# Patient Record
Sex: MALE | Race: WHITE | NOT HISPANIC OR LATINO | Employment: OTHER | URBAN - METROPOLITAN AREA
[De-identification: names, ages, dates, MRNs, and addresses within clinical notes are randomized per-mention and may not be internally consistent; named-entity substitution may affect disease eponyms.]

---

## 2017-01-10 ENCOUNTER — HOSPITAL ENCOUNTER (OUTPATIENT)
Dept: RADIOLOGY | Facility: HOSPITAL | Age: 62
Discharge: HOME | End: 2017-01-10
Attending: FAMILY MEDICINE
Payer: COMMERCIAL

## 2017-01-10 ENCOUNTER — OFFICE VISIT (OUTPATIENT)
Dept: FAMILY MEDICINE | Facility: CLINIC | Age: 62
End: 2017-01-10
Payer: COMMERCIAL

## 2017-01-10 VITALS
TEMPERATURE: 98 F | WEIGHT: 188.25 LBS | BODY MASS INDEX: 29.55 KG/M2 | DIASTOLIC BLOOD PRESSURE: 70 MMHG | OXYGEN SATURATION: 97 % | HEIGHT: 67 IN | HEART RATE: 105 BPM | SYSTOLIC BLOOD PRESSURE: 136 MMHG

## 2017-01-10 DIAGNOSIS — F41.9 ANXIETY: Primary | ICD-10-CM

## 2017-01-10 DIAGNOSIS — F41.1 GAD (GENERALIZED ANXIETY DISORDER): ICD-10-CM

## 2017-01-10 DIAGNOSIS — K22.70 BARRETT'S ESOPHAGUS WITHOUT DYSPLASIA: ICD-10-CM

## 2017-01-10 DIAGNOSIS — R07.9 CHEST PAIN: ICD-10-CM

## 2017-01-10 DIAGNOSIS — Z00.00 PREVENTATIVE HEALTH CARE: ICD-10-CM

## 2017-01-10 PROCEDURE — 99999 PR PBB SHADOW E&M-EST. PATIENT-LVL III: CPT | Mod: PBBFAC,,, | Performed by: FAMILY MEDICINE

## 2017-01-10 PROCEDURE — 99213 OFFICE O/P EST LOW 20 MIN: CPT | Mod: 25,S$GLB,, | Performed by: FAMILY MEDICINE

## 2017-01-10 PROCEDURE — 71020 XR CHEST PA AND LATERAL: CPT | Mod: TC,PO

## 2017-01-10 PROCEDURE — 1159F MED LIST DOCD IN RCRD: CPT | Mod: S$GLB,,, | Performed by: FAMILY MEDICINE

## 2017-01-10 PROCEDURE — 90686 IIV4 VACC NO PRSV 0.5 ML IM: CPT | Mod: S$GLB,,, | Performed by: FAMILY MEDICINE

## 2017-01-10 PROCEDURE — 71020 XR CHEST PA AND LATERAL: CPT | Mod: 26,,, | Performed by: RADIOLOGY

## 2017-01-10 PROCEDURE — 90471 IMMUNIZATION ADMIN: CPT | Mod: S$GLB,,, | Performed by: FAMILY MEDICINE

## 2017-01-10 RX ORDER — ALPRAZOLAM 1 MG/1
1 TABLET ORAL 2 TIMES DAILY PRN
Qty: 60 TABLET | Refills: 5 | Status: SHIPPED | OUTPATIENT
Start: 2017-01-10 | End: 2017-07-10 | Stop reason: SDUPTHER

## 2017-01-10 NOTE — MR AVS SNAPSHOT
[facility]  [address]  [place] [address]  Phone: [phone]  Fax: [phone]                  Jose Blanco   1/10/2017 1:40 PM   Office Visit    Description:  Male : 1955   Provider:  Red Camejo MD   Department:  [facility]           Reason for Visit     Anxiety           Diagnoses this Visit        Comments    Anxiety    -  Primary     Preventative health care         Diaz's esophagus without dysplasia         SHAYAN (generalized anxiety disorder)                To Do List           Future Appointments        Provider Department Dept Phone    7/10/2017 8:10 AM LAB, [place] [facility] [phone]    2017 2:00 PM Red Camejo MD [facility] [phone]      Goals (5 Years of Data)     None      Follow-Up and Disposition     Return in about 6 months (around 7/10/2017).       These Medications        Disp Refills Start End    alprazolam (XANAX) 1 MG tablet 60 tablet 5 1/10/2017     Take 1 tablet (1 mg total) by mouth 2 (two) times daily as needed for Anxiety. - Oral    Pharmacy: [id number] - [place] - [address]  #: [phone]         [facility] On Call     [facility] -  Assistance  Registered nurses in the [facility] provide clinical advisement, health education, appointment booking, and other advisory services.  Call for this free service at [phone].             Medications           Message regarding Medications     Verify the changes and/or additions to your medication regime listed below are the same as discussed with your clinician today.  If any of these changes or additions are incorrect, please notify your healthcare provider.             Verify that the below list of medications is an accurate representation of the medications you are currently taking.  If none reported, the list may be blank. If incorrect, please  "contact your healthcare provider. Carry this list with you in case of emergency.           Current Medications     alprazolam (XANAX) 1 MG tablet Take 1 tablet (1 mg total) by mouth 2 (two) times daily as needed for Anxiety.    esomeprazole (NEXIUM) 40 MG capsule Take 1 capsule (40 mg total) by mouth once daily.    sildenafil (REVATIO) 20 mg Tab Take 1 tablet (20 mg total) by mouth 3 (three) times daily.    venlafaxine (EFFEXOR-XR) 75 MG 24 hr capsule TAKE 1 CAPSULE (75 MG TOTAL) BY MOUTH ONCE DAILY.    meloxicam (MOBIC) 15 MG tablet Take 1 tablet (15 mg total) by mouth once daily.           Clinical Reference Information           Vital Signs - Last Recorded  Most recent update: 1/10/2017  1:45 PM by Kristine Recio MA    BP Pulse Temp Ht Wt SpO2    136/70 105 98.4 °F (36.9 °C) (Oral) 5' 7" (1.702 m) 85.4 kg (188 lb 4.4 oz) 97%    BMI                29.49 kg/m2          Blood Pressure          Most Recent Value    BP  136/70      Allergies as of 1/10/2017     No Known Drug Allergies      Immunizations Administered on Date of Encounter - 1/10/2017     Name Date Dose VIS Date Route    influenza - Quadrivalent - PF (ADULT) 1/10/2017 0.5 mL 8/7/2015 Intramuscular      Orders Placed During Today's Visit      Normal Orders This Visit    Influenza - Quadrivalent (3 years & older) (PF)     Future Labs/Procedures Expected by Expires    Comprehensive metabolic panel  7/9/2017 3/11/2018    Lipid panel  7/9/2017 1/10/2018    PSA, Screening  7/9/2017 3/11/2018      Smoking Cessation     If you would like to quit smoking:   You may be eligible for free services if you are a [place] resident and started smoking cigarettes before September 1, 1988.  Call the [employer]oking [employer]ess[employer] ([employer]) toll free at [phone] or [phone].   Call 0-093-QUIT-NOW if you do not meet the above criteria.            "
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
## 2017-04-05 RX ORDER — ESOMEPRAZOLE MAGNESIUM 40 MG/1
CAPSULE, DELAYED RELEASE ORAL
Qty: 30 CAPSULE | Refills: 11 | Status: SHIPPED | OUTPATIENT
Start: 2017-04-05 | End: 2018-03-08 | Stop reason: SDUPTHER

## 2017-06-05 DIAGNOSIS — F41.1 GAD (GENERALIZED ANXIETY DISORDER): ICD-10-CM

## 2017-06-05 RX ORDER — VENLAFAXINE HYDROCHLORIDE 75 MG/1
CAPSULE, EXTENDED RELEASE ORAL
Qty: 30 CAPSULE | Refills: 10 | Status: SHIPPED | OUTPATIENT
Start: 2017-06-05 | End: 2018-05-07 | Stop reason: SDUPTHER

## 2017-07-10 DIAGNOSIS — F41.1 GAD (GENERALIZED ANXIETY DISORDER): ICD-10-CM

## 2017-07-10 RX ORDER — ALPRAZOLAM 1 MG/1
1 TABLET ORAL 2 TIMES DAILY PRN
Qty: 60 TABLET | Refills: 5 | Status: SHIPPED | OUTPATIENT
Start: 2017-07-10 | End: 2018-03-08 | Stop reason: SDUPTHER

## 2018-03-08 ENCOUNTER — OFFICE VISIT (OUTPATIENT)
Dept: FAMILY MEDICINE | Facility: CLINIC | Age: 63
End: 2018-03-08
Payer: COMMERCIAL

## 2018-03-08 VITALS
OXYGEN SATURATION: 95 % | RESPIRATION RATE: 18 BRPM | TEMPERATURE: 99 F | HEART RATE: 85 BPM | SYSTOLIC BLOOD PRESSURE: 112 MMHG | WEIGHT: 187.81 LBS | HEIGHT: 67 IN | BODY MASS INDEX: 29.48 KG/M2 | DIASTOLIC BLOOD PRESSURE: 70 MMHG

## 2018-03-08 DIAGNOSIS — Z00.00 PREVENTATIVE HEALTH CARE: Primary | ICD-10-CM

## 2018-03-08 DIAGNOSIS — F41.1 GAD (GENERALIZED ANXIETY DISORDER): ICD-10-CM

## 2018-03-08 DIAGNOSIS — F41.9 ANXIETY: ICD-10-CM

## 2018-03-08 PROCEDURE — 99999 PR PBB SHADOW E&M-EST. PATIENT-LVL III: CPT | Mod: PBBFAC,,, | Performed by: FAMILY MEDICINE

## 2018-03-08 PROCEDURE — 99396 PREV VISIT EST AGE 40-64: CPT | Mod: S$GLB,,, | Performed by: FAMILY MEDICINE

## 2018-03-08 RX ORDER — ALPRAZOLAM 1 MG/1
1 TABLET ORAL 2 TIMES DAILY PRN
Qty: 60 TABLET | Refills: 5 | Status: SHIPPED | OUTPATIENT
Start: 2018-03-08 | End: 2018-06-15 | Stop reason: CLARIF

## 2018-03-08 RX ORDER — ESOMEPRAZOLE MAGNESIUM 40 MG/1
40 CAPSULE, DELAYED RELEASE ORAL DAILY
Qty: 30 CAPSULE | Refills: 11 | Status: ON HOLD | OUTPATIENT
Start: 2018-03-08 | End: 2018-06-18 | Stop reason: HOSPADM
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
## 2018-05-07 DIAGNOSIS — F41.1 GAD (GENERALIZED ANXIETY DISORDER): ICD-10-CM

## 2018-05-07 RX ORDER — VENLAFAXINE HYDROCHLORIDE 75 MG/1
CAPSULE, EXTENDED RELEASE ORAL
Qty: 30 CAPSULE | Refills: 10 | Status: SHIPPED | OUTPATIENT
Start: 2018-05-07 | End: 2019-03-31 | Stop reason: SDUPTHER

## 2018-06-15 PROBLEM — I21.4 NSTEMI (NON-ST ELEVATED MYOCARDIAL INFARCTION): Status: ACTIVE | Noted: 2018-06-15

## 2018-06-15 PROBLEM — I25.110 CORONARY ARTERY DISEASE INVOLVING NATIVE CORONARY ARTERY OF NATIVE HEART WITH UNSTABLE ANGINA PECTORIS: Status: ACTIVE | Noted: 2018-06-15

## 2018-06-15 PROBLEM — I20.9 ANGINA, CLASS IV: Status: ACTIVE | Noted: 2018-06-15

## 2018-06-16 PROBLEM — D69.6 THROMBOCYTOPENIA: Status: ACTIVE | Noted: 2018-06-16

## 2018-06-20 ENCOUNTER — TELEPHONE (OUTPATIENT)
Dept: ADMINISTRATIVE | Facility: HOSPITAL | Age: 63
End: 2018-06-20

## 2018-06-20 NOTE — TELEPHONE ENCOUNTER
[facility] nurse notified via email that patient was admitted to [facility] on 06/15/2018 for Angina pectoris, unspecified

## 2018-10-15 DIAGNOSIS — F41.1 GAD (GENERALIZED ANXIETY DISORDER): ICD-10-CM

## 2018-10-15 RX ORDER — ALPRAZOLAM 1 MG/1
1 TABLET ORAL 2 TIMES DAILY PRN
Qty: 60 TABLET | Refills: 0 | Status: SHIPPED | OUTPATIENT
Start: 2018-10-15 | End: 2018-11-14 | Stop reason: SDUPTHER

## 2018-11-06 ENCOUNTER — OFFICE VISIT (OUTPATIENT)
Dept: FAMILY MEDICINE | Facility: CLINIC | Age: 63
End: 2018-11-06
Payer: COMMERCIAL

## 2018-11-06 VITALS
DIASTOLIC BLOOD PRESSURE: 86 MMHG | OXYGEN SATURATION: 96 % | HEIGHT: 67 IN | HEART RATE: 89 BPM | WEIGHT: 184.5 LBS | TEMPERATURE: 98 F | SYSTOLIC BLOOD PRESSURE: 114 MMHG | RESPIRATION RATE: 18 BRPM | BODY MASS INDEX: 28.96 KG/M2

## 2018-11-06 DIAGNOSIS — K22.70 BARRETT'S ESOPHAGUS WITHOUT DYSPLASIA: ICD-10-CM

## 2018-11-06 DIAGNOSIS — F41.9 ANXIETY: ICD-10-CM

## 2018-11-06 DIAGNOSIS — I25.110 CORONARY ARTERY DISEASE INVOLVING NATIVE CORONARY ARTERY OF NATIVE HEART WITH UNSTABLE ANGINA PECTORIS: ICD-10-CM

## 2018-11-06 DIAGNOSIS — I21.4 NSTEMI (NON-ST ELEVATED MYOCARDIAL INFARCTION): Primary | ICD-10-CM

## 2018-11-06 PROCEDURE — 90471 IMMUNIZATION ADMIN: CPT | Mod: S$GLB,,, | Performed by: FAMILY MEDICINE

## 2018-11-06 PROCEDURE — 90686 IIV4 VACC NO PRSV 0.5 ML IM: CPT | Mod: S$GLB,,, | Performed by: FAMILY MEDICINE

## 2018-11-06 PROCEDURE — 99999 PR PBB SHADOW E&M-EST. PATIENT-LVL IV: CPT | Mod: PBBFAC,,, | Performed by: FAMILY MEDICINE

## 2018-11-06 PROCEDURE — 3008F BODY MASS INDEX DOCD: CPT | Mod: CPTII,S$GLB,, | Performed by: FAMILY MEDICINE

## 2018-11-06 PROCEDURE — 99214 OFFICE O/P EST MOD 30 MIN: CPT | Mod: 25,S$GLB,, | Performed by: FAMILY MEDICINE

## 2018-11-06 RX ORDER — NITROGLYCERIN 0.4 MG/1
0.4 TABLET SUBLINGUAL EVERY 5 MIN PRN
Qty: 100 TABLET | Refills: 0 | Status: ON HOLD | OUTPATIENT
Start: 2018-11-06 | End: 2024-01-28 | Stop reason: HOSPADM

## 2018-11-06 RX ORDER — ESOMEPRAZOLE MAGNESIUM 40 MG/1
CAPSULE, DELAYED RELEASE ORAL
COMMUNITY
Start: 2018-10-31 | End: 2019-04-01 | Stop reason: SDUPTHER

## 2018-11-06 RX ORDER — METOPROLOL SUCCINATE 25 MG/1
25 TABLET, EXTENDED RELEASE ORAL DAILY
Refills: 10 | COMMUNITY
Start: 2018-10-08 | End: 2024-01-12 | Stop reason: SDUPTHER

## 2018-11-06 NOTE — PROGRESS NOTES
Chief Complaint   Patient presents with    Follow-up     heart attack mejia 15 of this year, 2 stents     This is a 63-year-old white male who presents for 6 month f/u on admission in June for MI.    Admission Date: 6/15/2018  Hospital Length of Stay: 3 days  Discharge Date and Time:  06/18/2018 9:10 AM  Attending Physician: No att. providers found  Discharging Provider: Scott Lancaster MD  Primary Care Physician: Red Camejo MD     HPI:   63 year old male who presented to ER on 6/15/18 with c/o chest pain that began at approximately 2am that morning. He reports associated radiation to left arm with diaphoresis and nausea. Initial troponin in ER was 26.8.      PMH: HLD, BPH, Diaz esophagus, anxiety, hearing loss (hearing aids), smoker     Hospital Course:   6/15- LHC with PCI/JEANETTE LAD and RCA via left radial artery and mild global hypokinesis on LV gram  -troponin 26.8-->30.3-->17.8  6/16/18- severe and acute thrombocytopenia with platelets 17k - hematology and PCP consulted  -thrombocytopenia most likely from tirofiban since patient is newly exposed to heparin and antibodies would have a slower drop in platelets. Continue ASA and brilinta s/p PCI/JEANETTE.  -change protonix to famotidine due to thrombocytopenia- resume protonix once thrombocytopenia resolved.    Walking some 4 days a week.    CAD s/p stents, previous MI - tolerating Lipitor 80mg, Coreg 3.125mg BID, lisinopril 2.5mg, Brilinta, ASA 81mg; following with cardiology, Dr. zarco  Anxiety -  Tolerating Effexor XR 75mg daily.  Mood is controlled. He is taking Xanax 1mg 1.5 - 2 tabs daily.  Actively .  Barretts - doing well on the Nexium 40 mg daily  Wearing hearing aids now.       Past Medical History:   Diagnosis Date    Anxiety     Diaz esophagus     BPH (benign prostatic hyperplasia)     CAD (coronary artery disease)     Essential tremor     HEARING LOSS     hearing aids    History of MI (myocardial infarction)     HLD  (hyperlipidemia)     Shingles     right upper back     Past Surgical History:   Procedure Laterality Date    CORONARY STENT PLACEMENT      RCA, LAD    Left heart cath Left 6/15/2018    Performed by Gino Olson MD at [facility] CATH    LEFT HEART CATHETERIZATION Left 6/15/2018    Procedure: Left heart cath;  Surgeon: Gino Olson MD;  Location: [facility] CATH;  Service: Cardiology;  Laterality: Left;    NASAL SEPTUM SURGERY      TONSILLECTOMY, ADENOIDECTOMY, BILATERAL MYRINGOTOMY AND TUBES       Current Outpatient Medications on File Prior to Visit   Medication Sig Dispense Refill    ALPRAZolam (XANAX) 1 MG tablet TAKE 1 TABLET (1 MG TOTAL) BY MOUTH 2 (TWO) TIMES DAILY AS NEEDED FOR ANXIETY. 60 tablet 0    aspirin (ECOTRIN) 81 MG EC tablet Take 1 tablet (81 mg total) by mouth once daily.  0    atorvastatin (LIPITOR) 80 MG tablet Take 1 tablet (80 mg total) by mouth every evening. 90 tablet 3    carvedilol (COREG) 3.125 MG tablet Take 1 tablet (3.125 mg total) by mouth 2 (two) times daily. 60 tablet 11    esomeprazole (NEXIUM) 40 MG capsule       famotidine (PEPCID) 20 MG tablet Take 1 tablet (20 mg total) by mouth 2 (two) times daily. 60 tablet 11    lisinopril (PRINIVIL,ZESTRIL) 2.5 MG tablet Take 1 tablet (2.5 mg total) by mouth once daily. 90 tablet 3    metoprolol succinate (TOPROL-XL) 25 MG 24 hr tablet Take 25 mg by mouth once daily.  10    ticagrelor (BRILINTA) 90 mg tablet Take 1 tablet (90 mg total) by mouth 2 (two) times daily. 60 tablet 11    venlafaxine (EFFEXOR-XR) 75 MG 24 hr capsule TAKE 1 CAPSULE (75 MG TOTAL) BY MOUTH ONCE DAILY. 30 capsule 10     No current facility-administered medications on file prior to visit.          FAMILY HISTORY:  Father: CABG at 67, prostate cancer  Mother:alzheimer's  3 sisters  1 brother    SOCIAL HISTORY:  Tobacco use: variable (1/2 PPD)  Alcohol use:weekly wine  Ilicit drug use:denies  Occupation:residential development,   Marital  "status:  Children:5    HEALTH MAINTANENCE:  stress test 2009 normal    REVIEW OF SYSTEMS:  GENERAL: No fever, chills, fatigability or weight changes.  SKIN/BREASTS: No rashes, sores, itching or changes in color or texture of skin.  HEAD: No headaches or recent head trauma.   EYES: Visual acuity fine. Denies blurriness, tearing, itching, photophobia, diplopia, or visual changes.   EARS: Denies ear pain, discharge, tinnitus or vertigo.  NOSE: No loss of smell, epistaxis, postnasal drip, discharge, obstruction, or sneezing.  MOUTH & THROAT: No hoarseness, change in voice, swallowing difficulty. No excessive gum bleeding.   HEMATOLOGICAL/NODES: Denies swollen glands. No bleeding or bruising.  CHEST: No KOVACS, cyanosis, wheezing, cough or sputum production.  CARDIOVASCULAR: Denies dyspnea, orthopnea, or palpitations.  GI/ABDOMEN: Appetite fine. No weight loss. Denies nausea, vomiting, diarrhea, constipation, abdominal pain, hematemesis or blood in stool.  URINARY: No dysuria,hematuria, nocturia, incontinence, flank pain, urgency, or urinary difficulty.  PERIPHERAL VASCULAR: No claudication, cold intolerance or cyanosis.  MUSCULOSKELETAL: No joint stiffness, pain, swelling, or loss of strength. Denies back pain.  NEUROLOGIC: No history of seizures, paralysis, alteration of gait or coordination.    PHYSICAL EXAM:  /86 (BP Location: Left arm, Patient Position: Sitting)   Pulse 89   Temp 98.1 °F (36.7 °C)   Resp 18   Ht 5' 7" (1.702 m)   Wt 83.7 kg (184 lb 8.4 oz)   SpO2 96%   BMI 28.90 kg/m²     APPEARANCE: Well nourished, well developed, neatly groomed, in no acute distress.   SKIN: Warm, moist and dry. No lesions or rashes.  NECK: Supple with full range of motion. No masses. No thyromegaly. No JVD or bruits.  CHEST: CTA bilaterally  CV: S1S2 rrr no m/r/g  EXTREMITIES: No edema or cyanosis. Pedal pulses 2+ bilaterally. No varicosities   Gait & Posture: Normal gait and fine motion.  PSYCH: Awake, alert, " oriented to person, place, time, and situation. Pleasant and cooperative mood with intact judgment.    ASSESSMENT/PLAN:  NSTEMI (non-ST elevated myocardial infarction)    Coronary artery disease involving native coronary artery of native heart with unstable angina pectoris    Diaz's esophagus without dysplasia    Anxiety    Other orders  -     nitroGLYCERIN (NITROSTAT) 0.4 MG SL tablet; Place 1 tablet (0.4 mg total) under the tongue every 5 (five) minutes as needed for Chest pain.  Dispense: 100 tablet; Refill: 0  -     Influenza - Quadrivalent (3 years & older) (PF)    will get recent labs  Flu shot  Counseled on regular exercise, maintenance of a healthy weight, balanced diet rich in fruits/vegetables and lean protein, and avoidance of unhealthy habits like smoking and excessive alcohol intake.  tobacco use disorder - continue tobacco cessation efforts  Xanax 1mg prn anxiety  Effexor 75mg daily  continue Nexium  F/u 6 months

## 2018-11-08 PROBLEM — D69.6 THROMBOCYTOPENIA: Status: RESOLVED | Noted: 2018-06-16 | Resolved: 2018-11-08

## 2018-11-14 DIAGNOSIS — F41.1 GAD (GENERALIZED ANXIETY DISORDER): ICD-10-CM

## 2018-11-14 RX ORDER — ALPRAZOLAM 1 MG/1
TABLET ORAL
Qty: 60 TABLET | Refills: 5 | Status: SHIPPED | OUTPATIENT
Start: 2018-11-14 | End: 2019-05-06 | Stop reason: SDUPTHER

## 2019-02-04 RX ORDER — ESOMEPRAZOLE MAGNESIUM 40 MG/1
40 CAPSULE, DELAYED RELEASE ORAL DAILY
Qty: 90 CAPSULE | Refills: 3 | Status: SHIPPED | OUTPATIENT
Start: 2019-02-04 | End: 2020-05-06 | Stop reason: SDUPTHER

## 2019-02-04 NOTE — PROGRESS NOTES
Refill Authorization Note     is requesting a refill authorization.    Brief assessment and rationale for refill: ROUTE: OP (outside of protocol)                             Name and strength of medication: esomeprazole (NEXIUM) 40 MG capsule     Medication reconciliation completed: No        Comments:   Appointments (past 12 m or future 3m authorizing provider)  LAST VISIT DATE  Red Camejo MD 11/6/2018         NEXT VISIT DATE  Red Camejo MD 5/6/2019      Last e-scripted to  Pharmacy: [facility]   Date:  03/08/2019    Supply: 12 month

## 2019-03-31 DIAGNOSIS — F41.1 GAD (GENERALIZED ANXIETY DISORDER): ICD-10-CM

## 2019-04-01 NOTE — PROGRESS NOTES
Refill Authorization Note     is requesting a refill authorization.    Brief assessment and rationale for refill: ROUTE: op   Name and strength of medication: venlafaxine (EFFEXOR-XR) 75 MG 24 hr capsule  Medication-related problems identified: Therapeutic duplication    Medication Therapy Plan: ANXIETY- tolerating effexor xr 75mg, mood is controlled; lco(lov); DCed duplicates    Medication reconciliation completed: No                         Comments:   APPOINTMENTS (past 12 m or future  authorizing provider)  LAST VISIT DATE  Red Camejo MD 11/6/2018         NEXT VISIT DATE  Red Camejo MD 5/6/2019

## 2019-04-02 RX ORDER — VENLAFAXINE HYDROCHLORIDE 75 MG/1
CAPSULE, EXTENDED RELEASE ORAL
Qty: 90 CAPSULE | Refills: 3 | Status: SHIPPED | OUTPATIENT
Start: 2019-04-02 | End: 2019-04-22 | Stop reason: SDUPTHER

## 2019-04-19 ENCOUNTER — PATIENT MESSAGE (OUTPATIENT)
Dept: FAMILY MEDICINE | Facility: CLINIC | Age: 64
End: 2019-04-19

## 2019-04-20 ENCOUNTER — PATIENT MESSAGE (OUTPATIENT)
Dept: FAMILY MEDICINE | Facility: CLINIC | Age: 64
End: 2019-04-20

## 2019-04-22 DIAGNOSIS — F41.1 GAD (GENERALIZED ANXIETY DISORDER): ICD-10-CM

## 2019-04-22 RX ORDER — VENLAFAXINE HYDROCHLORIDE 75 MG/1
75 CAPSULE, EXTENDED RELEASE ORAL DAILY
Qty: 90 CAPSULE | Refills: 0 | Status: SHIPPED | OUTPATIENT
Start: 2019-04-22 | End: 2020-07-16

## 2019-04-23 ENCOUNTER — PATIENT OUTREACH (OUTPATIENT)
Dept: ADMINISTRATIVE | Facility: HOSPITAL | Age: 64
End: 2019-04-23

## 2019-04-23 NOTE — PROGRESS NOTES
Health Maintenance Due   Topic Date Due    Zoster Vaccine  02/05/2017     Future Appointments   Date Time Provider Department Center   5/6/2019  2:20 PM Red Camejo MD [facility]     Pre-visit Chart review complete/scrubbed for this upcoming office visit

## 2019-05-06 ENCOUNTER — HOSPITAL ENCOUNTER (OUTPATIENT)
Dept: RADIOLOGY | Facility: HOSPITAL | Age: 64
Discharge: HOME | End: 2019-05-06
Attending: FAMILY MEDICINE
Payer: COMMERCIAL

## 2019-05-06 ENCOUNTER — OFFICE VISIT (OUTPATIENT)
Dept: FAMILY MEDICINE | Facility: CLINIC | Age: 64
End: 2019-05-06
Payer: COMMERCIAL

## 2019-05-06 VITALS
BODY MASS INDEX: 29.13 KG/M2 | OXYGEN SATURATION: 96 % | RESPIRATION RATE: 18 BRPM | HEART RATE: 79 BPM | WEIGHT: 185.63 LBS | DIASTOLIC BLOOD PRESSURE: 80 MMHG | HEIGHT: 67 IN | SYSTOLIC BLOOD PRESSURE: 110 MMHG

## 2019-05-06 DIAGNOSIS — M25.562 LEFT KNEE PAIN, UNSPECIFIED CHRONICITY: ICD-10-CM

## 2019-05-06 DIAGNOSIS — F41.9 ANXIETY: ICD-10-CM

## 2019-05-06 DIAGNOSIS — Z00.00 PREVENTATIVE HEALTH CARE: Primary | ICD-10-CM

## 2019-05-06 DIAGNOSIS — I25.110 CORONARY ARTERY DISEASE INVOLVING NATIVE CORONARY ARTERY OF NATIVE HEART WITH UNSTABLE ANGINA PECTORIS: ICD-10-CM

## 2019-05-06 DIAGNOSIS — F41.1 GAD (GENERALIZED ANXIETY DISORDER): ICD-10-CM

## 2019-05-06 PROCEDURE — 99999 PR PBB SHADOW E&M-EST. PATIENT-LVL IV: ICD-10-PCS | Mod: PBBFAC,,, | Performed by: FAMILY MEDICINE

## 2019-05-06 PROCEDURE — 99999 PR PBB SHADOW E&M-EST. PATIENT-LVL IV: CPT | Mod: PBBFAC,,, | Performed by: FAMILY MEDICINE

## 2019-05-06 PROCEDURE — 99396 PREV VISIT EST AGE 40-64: CPT | Mod: S$GLB,,, | Performed by: FAMILY MEDICINE

## 2019-05-06 PROCEDURE — 73565 X-RAY EXAM OF KNEES: CPT | Mod: TC,FY,PO

## 2019-05-06 PROCEDURE — 73565 X-RAY EXAM OF KNEES: CPT | Mod: 26,,, | Performed by: RADIOLOGY

## 2019-05-06 PROCEDURE — 99396 PR PREVENTIVE VISIT,EST,40-64: ICD-10-PCS | Mod: S$GLB,,, | Performed by: FAMILY MEDICINE

## 2019-05-06 PROCEDURE — 73565 XR KNEE AP STANDING BILATERAL: ICD-10-PCS | Mod: 26,,, | Performed by: RADIOLOGY

## 2019-05-06 RX ORDER — ALPRAZOLAM 1 MG/1
1 TABLET ORAL 2 TIMES DAILY
Qty: 60 TABLET | Refills: 5 | Status: SHIPPED | OUTPATIENT
Start: 2019-05-06 | End: 2019-11-06 | Stop reason: SDUPTHER

## 2019-05-06 NOTE — PROGRESS NOTES
Chief Complaint   Patient presents with    Follow-up     This is a 64-year-old white male who presents for f/u on chronic health issues    CAD s/p stents, previous MI - tolerating Lipitor 80mg, toprol XL 25mg daily, lisinopril 2.5mg, Brilinta BID, ASA 81mg; following with cardiology, Dr. Muñoz  Anxiety -  Tolerating Effexor XR 75mg daily.  Mood is controlled. He is taking Xanax 1mg 1.5 - 2 tabs daily.   Barretts - doing well on the Nexium 40 mg daily  Wearing hearing aids now.   Using tylenol TID for left knee pain which was aggravated after doing some roof work 2 weeks ago.      Past Medical History:   Diagnosis Date    Anxiety     Diaz esophagus     BPH (benign prostatic hyperplasia)     CAD (coronary artery disease)     Essential tremor     HEARING LOSS     hearing aids    History of MI (myocardial infarction)     HLD (hyperlipidemia)     Shingles     right upper back     Past Surgical History:   Procedure Laterality Date    CORONARY STENT PLACEMENT      RCA, LAD    Left heart cath Left 6/15/2018    Performed by Gino [facility] at [facility] CATH    NASAL SEPTUM SURGERY      TONSILLECTOMY, ADENOIDECTOMY, BILATERAL MYRINGOTOMY AND TUBES       Current Outpatient Medications on File Prior to Visit   Medication Sig Dispense Refill    aspirin (ECOTRIN) 81 MG EC tablet Take 1 tablet (81 mg total) by mouth once daily.  0    atorvastatin (LIPITOR) 80 MG tablet Take 1 tablet (80 mg total) by mouth every evening. 90 tablet 3    carvedilol (COREG) 3.125 MG tablet Take 1 tablet (3.125 mg total) by mouth 2 (two) times daily. 60 tablet 11    esomeprazole (NEXIUM) 40 MG capsule TAKE 1 CAPSULE (40 MG TOTAL) BY MOUTH ONCE DAILY. 90 capsule 3    famotidine (PEPCID) 20 MG tablet Take 1 tablet (20 mg total) by mouth 2 (two) times daily. 60 tablet 11    lisinopril (PRINIVIL,ZESTRIL) 2.5 MG tablet Take 1 tablet (2.5 mg total) by mouth once daily. 90 tablet 3    metoprolol succinate (TOPROL-XL) 25 MG 24 hr tablet  Take 25 mg by mouth once daily.  10    nitroGLYCERIN (NITROSTAT) 0.4 MG SL tablet Place 1 tablet (0.4 mg total) under the tongue every 5 (five) minutes as needed for Chest pain. 100 tablet 0    ticagrelor (BRILINTA) 90 mg tablet Take 1 tablet (90 mg total) by mouth 2 (two) times daily. 60 tablet 11    venlafaxine (EFFEXOR-XR) 75 MG 24 hr capsule Take 1 capsule (75 mg total) by mouth once daily. 90 capsule 0     No current facility-administered medications on file prior to visit.          FAMILY HISTORY:  Father: CABG at 67, prostate cancer  Mother:alzheimer's  3 sisters  1 brother    SOCIAL HISTORY:  Tobacco use: variable (1/2 PPD)  Alcohol use:weekly wine  Ilicit drug use:denies  Occupation:residential development,   Marital status:  Children:5    HEALTH MAINTANENCE:  stress test 2009 normal    REVIEW OF SYSTEMS:  GENERAL: No fever, chills, fatigability or weight changes.  SKIN/BREASTS: No rashes, sores, itching or changes in color or texture of skin.  HEAD: No headaches or recent head trauma.   EYES: Visual acuity fine. Denies blurriness, tearing, itching, photophobia, diplopia, or visual changes.   EARS: Denies ear pain, discharge, tinnitus or vertigo.  NOSE: No loss of smell, epistaxis, postnasal drip, discharge, obstruction, or sneezing.  MOUTH & THROAT: No hoarseness, change in voice, swallowing difficulty. No excessive gum bleeding.   HEMATOLOGICAL/NODES: Denies swollen glands. No bleeding or bruising.  CHEST: No KOVACS, cyanosis, wheezing, cough or sputum production.  CARDIOVASCULAR: Denies dyspnea, orthopnea, or palpitations.  GI/ABDOMEN: Appetite fine. No weight loss. Denies nausea, vomiting, diarrhea, constipation, abdominal pain, hematemesis or blood in stool.  URINARY: No dysuria,hematuria, nocturia, incontinence, flank pain, urgency, or urinary difficulty.  PERIPHERAL VASCULAR: No claudication, cold intolerance or cyanosis.  MUSCULOSKELETAL: No joint stiffness, pain, swelling, or loss of  "strength. Denies back pain.  NEUROLOGIC: No history of seizures, paralysis, alteration of gait or coordination.    PHYSICAL EXAM:  /80   Pulse 79   Resp 18   Ht 5' 7" (1.702 m)   Wt 84.2 kg (185 lb 10 oz)   SpO2 96%   BMI 29.07 kg/m²     APPEARANCE: Well nourished, well developed, neatly groomed, in no acute distress.   SKIN: Warm, moist and dry. No lesions or rashes.  NECK: Supple with full range of motion. No masses. No thyromegaly. No JVD or bruits.  CHEST: CTA bilaterally  CV: S1S2 rrr no m/r/g  EXTREMITIES: No edema or cyanosis. Pedal pulses 2+ bilaterally. No varicosities   Gait & Posture: Normal gait and fine motion.  PSYCH: Awake, alert, oriented to person, place, time, and situation. Pleasant and cooperative mood with intact judgment.    ASSESSMENT/PLAN:    Preventative health care  -     Comprehensive metabolic panel; Future; Expected date: 05/06/2019  -     Lipid panel; Future; Expected date: 05/06/2019  -     PSA, Screening; Future; Expected date: 05/06/2019  -     CBC auto differential; Future; Expected date: 05/06/2019    Coronary artery disease involving native coronary artery of native heart with unstable angina pectoris    Anxiety    SHAYAN (generalized anxiety disorder)  -     ALPRAZolam (XANAX) 1 MG tablet; Take 1 tablet (1 mg total) by mouth 2 (two) times daily.  Dispense: 60 tablet; Refill: 5    Left knee pain, unspecified chronicity  -     X-Ray Knee AP Standing Bilateral; Future; Expected date: 05/06/2019      Counseled on regular exercise, maintenance of a healthy weight, balanced diet rich in fruits/vegetables and lean protein, and avoidance of unhealthy habits like smoking and excessive alcohol intake.  tobacco use disorder - continue tobacco cessation efforts  Xanax 1mg prn anxiety  Effexor 75mg daily  continue Nexium  F/u 6 months        "

## 2019-06-17 ENCOUNTER — LAB VISIT (OUTPATIENT)
Dept: LAB | Facility: HOSPITAL | Age: 64
End: 2019-06-17
Attending: FAMILY MEDICINE
Payer: COMMERCIAL

## 2019-06-17 DIAGNOSIS — Z00.00 PREVENTATIVE HEALTH CARE: ICD-10-CM

## 2019-06-17 LAB
ALBUMIN SERPL BCP-MCNC: 3.7 G/DL (ref 3.5–5.2)
ALP SERPL-CCNC: 112 U/L (ref 55–135)
ALT SERPL W/O P-5'-P-CCNC: 24 U/L (ref 10–44)
ANION GAP SERPL CALC-SCNC: 8 MMOL/L (ref 8–16)
AST SERPL-CCNC: 26 U/L (ref 10–40)
BASOPHILS # BLD AUTO: 0.05 K/UL (ref 0–0.2)
BASOPHILS NFR BLD: 0.5 % (ref 0–1.9)
BILIRUB SERPL-MCNC: 1 MG/DL (ref 0.1–1)
BUN SERPL-MCNC: 13 MG/DL (ref 8–23)
CALCIUM SERPL-MCNC: 9.5 MG/DL (ref 8.7–10.5)
CHLORIDE SERPL-SCNC: 106 MMOL/L (ref 95–110)
CHOLEST SERPL-MCNC: 136 MG/DL (ref 120–199)
CHOLEST/HDLC SERPL: 4 {RATIO} (ref 2–5)
CO2 SERPL-SCNC: 25 MMOL/L (ref 23–29)
COMPLEXED PSA SERPL-MCNC: 3.3 NG/ML (ref 0–4)
CREAT SERPL-MCNC: 1 MG/DL (ref 0.5–1.4)
DIFFERENTIAL METHOD: NORMAL
EOSINOPHIL # BLD AUTO: 0.3 K/UL (ref 0–0.5)
EOSINOPHIL NFR BLD: 3.2 % (ref 0–8)
ERYTHROCYTE [DISTWIDTH] IN BLOOD BY AUTOMATED COUNT: 13.9 % (ref 11.5–14.5)
EST. GFR  (AFRICAN AMERICAN): >60 ML/MIN/1.73 M^2
EST. GFR  (NON AFRICAN AMERICAN): >60 ML/MIN/1.73 M^2
GLUCOSE SERPL-MCNC: 89 MG/DL (ref 70–110)
HCT VFR BLD AUTO: 48.6 % (ref 40–54)
HDLC SERPL-MCNC: 34 MG/DL (ref 40–75)
HDLC SERPL: 25 % (ref 20–50)
HGB BLD-MCNC: 16.2 G/DL (ref 14–18)
IMM GRANULOCYTES # BLD AUTO: 0.03 K/UL (ref 0–0.04)
IMM GRANULOCYTES NFR BLD AUTO: 0.3 % (ref 0–0.5)
LDLC SERPL CALC-MCNC: 81.4 MG/DL (ref 63–159)
LYMPHOCYTES # BLD AUTO: 4.6 K/UL (ref 1–4.8)
LYMPHOCYTES NFR BLD: 43.6 % (ref 18–48)
MCH RBC QN AUTO: 31 PG (ref 27–31)
MCHC RBC AUTO-ENTMCNC: 33.3 G/DL (ref 32–36)
MCV RBC AUTO: 93 FL (ref 82–98)
MONOCYTES # BLD AUTO: 0.8 K/UL (ref 0.3–1)
MONOCYTES NFR BLD: 7.7 % (ref 4–15)
NEUTROPHILS # BLD AUTO: 4.8 K/UL (ref 1.8–7.7)
NEUTROPHILS NFR BLD: 44.7 % (ref 38–73)
NONHDLC SERPL-MCNC: 102 MG/DL
NRBC BLD-RTO: 0 /100 WBC
PLATELET # BLD AUTO: 269 K/UL (ref 150–350)
PMV BLD AUTO: 10.7 FL (ref 9.2–12.9)
POTASSIUM SERPL-SCNC: 4.4 MMOL/L (ref 3.5–5.1)
PROT SERPL-MCNC: 7.2 G/DL (ref 6–8.4)
RBC # BLD AUTO: 5.22 M/UL (ref 4.6–6.2)
SODIUM SERPL-SCNC: 139 MMOL/L (ref 136–145)
TRIGL SERPL-MCNC: 103 MG/DL (ref 30–150)
WBC # BLD AUTO: 10.62 K/UL (ref 3.9–12.7)

## 2019-06-17 PROCEDURE — 36415 COLL VENOUS BLD VENIPUNCTURE: CPT | Mod: PO

## 2019-06-17 PROCEDURE — 80061 LIPID PANEL: CPT

## 2019-06-17 PROCEDURE — 80053 COMPREHEN METABOLIC PANEL: CPT

## 2019-06-17 PROCEDURE — 84153 ASSAY OF PSA TOTAL: CPT

## 2019-06-17 PROCEDURE — 85025 COMPLETE CBC W/AUTO DIFF WBC: CPT

## 2019-10-09 ENCOUNTER — PATIENT MESSAGE (OUTPATIENT)
Dept: FAMILY MEDICINE | Facility: CLINIC | Age: 64
End: 2019-10-09

## 2019-10-10 RX ORDER — SILDENAFIL CITRATE 20 MG/1
20 TABLET ORAL 3 TIMES DAILY PRN
Qty: 30 TABLET | Refills: 3 | Status: SHIPPED | OUTPATIENT
Start: 2019-10-10 | End: 2020-04-15 | Stop reason: SDUPTHER

## 2019-10-10 RX ORDER — SILDENAFIL CITRATE 20 MG/1
TABLET ORAL
COMMUNITY
Start: 2019-10-09 | End: 2019-10-10

## 2019-10-23 ENCOUNTER — PATIENT OUTREACH (OUTPATIENT)
Dept: ADMINISTRATIVE | Facility: HOSPITAL | Age: 64
End: 2019-10-23

## 2019-10-23 ENCOUNTER — TELEPHONE (OUTPATIENT)
Dept: ADMINISTRATIVE | Facility: HOSPITAL | Age: 64
End: 2019-10-23

## 2019-10-23 RX ORDER — ATORVASTATIN CALCIUM 80 MG/1
80 TABLET, FILM COATED ORAL NIGHTLY
Qty: 90 TABLET | Refills: 3 | Status: SHIPPED | OUTPATIENT
Start: 2019-10-23 | End: 2021-02-10

## 2019-10-23 NOTE — PROGRESS NOTES
Health Maintenance Due   Topic Date Due    Aspirin/Antiplatelet Therapy  02/05/1975    High Dose Statin  02/05/1978    Shingles Vaccine (1 of 2) 02/05/2007     Chart review completed 10/23/2019   Future Appointments   Date Time Provider Department Center   11/6/2019  1:40 PM Red Camejo MD [facility]

## 2019-11-06 ENCOUNTER — OFFICE VISIT (OUTPATIENT)
Dept: FAMILY MEDICINE | Facility: CLINIC | Age: 64
End: 2019-11-06
Payer: COMMERCIAL

## 2019-11-06 VITALS
HEART RATE: 89 BPM | OXYGEN SATURATION: 96 % | BODY MASS INDEX: 29.58 KG/M2 | HEIGHT: 67 IN | DIASTOLIC BLOOD PRESSURE: 86 MMHG | WEIGHT: 188.5 LBS | TEMPERATURE: 98 F | SYSTOLIC BLOOD PRESSURE: 130 MMHG

## 2019-11-06 DIAGNOSIS — F41.1 GAD (GENERALIZED ANXIETY DISORDER): ICD-10-CM

## 2019-11-06 DIAGNOSIS — I25.110 CORONARY ARTERY DISEASE INVOLVING NATIVE CORONARY ARTERY OF NATIVE HEART WITH UNSTABLE ANGINA PECTORIS: ICD-10-CM

## 2019-11-06 DIAGNOSIS — F41.9 ANXIETY: Primary | ICD-10-CM

## 2019-11-06 DIAGNOSIS — Z00.00 PREVENTATIVE HEALTH CARE: ICD-10-CM

## 2019-11-06 DIAGNOSIS — Z12.9 SCREENING FOR CANCER: ICD-10-CM

## 2019-11-06 DIAGNOSIS — K22.70 BARRETT'S ESOPHAGUS WITHOUT DYSPLASIA: ICD-10-CM

## 2019-11-06 PROBLEM — I21.4 NSTEMI (NON-ST ELEVATED MYOCARDIAL INFARCTION): Status: RESOLVED | Noted: 2018-06-15 | Resolved: 2019-11-06

## 2019-11-06 PROBLEM — I20.9 ANGINA, CLASS IV: Status: RESOLVED | Noted: 2018-06-15 | Resolved: 2019-11-06

## 2019-11-06 PROCEDURE — 99214 PR OFFICE/OUTPT VISIT, EST, LEVL IV, 30-39 MIN: ICD-10-PCS | Mod: S$GLB,,, | Performed by: FAMILY MEDICINE

## 2019-11-06 PROCEDURE — 3008F BODY MASS INDEX DOCD: CPT | Mod: CPTII,S$GLB,, | Performed by: FAMILY MEDICINE

## 2019-11-06 PROCEDURE — 99999 PR PBB SHADOW E&M-EST. PATIENT-LVL IV: ICD-10-PCS | Mod: PBBFAC,,, | Performed by: FAMILY MEDICINE

## 2019-11-06 PROCEDURE — 99214 OFFICE O/P EST MOD 30 MIN: CPT | Mod: S$GLB,,, | Performed by: FAMILY MEDICINE

## 2019-11-06 PROCEDURE — 3008F PR BODY MASS INDEX (BMI) DOCUMENTED: ICD-10-PCS | Mod: CPTII,S$GLB,, | Performed by: FAMILY MEDICINE

## 2019-11-06 PROCEDURE — 99999 PR PBB SHADOW E&M-EST. PATIENT-LVL IV: CPT | Mod: PBBFAC,,, | Performed by: FAMILY MEDICINE

## 2019-11-06 RX ORDER — ALPRAZOLAM 1 MG/1
1 TABLET ORAL 2 TIMES DAILY
Qty: 60 TABLET | Refills: 5 | Status: SHIPPED | OUTPATIENT
Start: 2019-11-06 | End: 2020-05-06 | Stop reason: SDUPTHER

## 2019-11-06 NOTE — PROGRESS NOTES
Chief Complaint   Patient presents with    Follow-up     6 month     This is a 64-year-old white male who presents for 6 month f/u on chronic health issues    Overall doing well  CAD s/p stents, previous MI - tolerating Lipitor 80mg, toprol, lisinopril 2.5mg, Brilinta BID, ASA 81mg; following with cardiology, Dr. Muñoz  Anxiety -  Tolerating Effexor XR 75mg daily.  Mood is controlled. He is taking Xanax 1mg 1.5 - 2 tabs daily.   Barretts - doing well on the Nexium 40 mg daily  Wearing hearing aids now.   Cutting back on smoking.      Past Medical History:   Diagnosis Date    Anxiety     Diaz esophagus     BPH (benign prostatic hyperplasia)     CAD (coronary artery disease)     Essential tremor     HEARING LOSS     hearing aids    History of MI (myocardial infarction)     HLD (hyperlipidemia)     Shingles     right upper back     Past Surgical History:   Procedure Laterality Date    CORONARY STENT PLACEMENT      RCA, LAD    LEFT HEART CATHETERIZATION Left 6/15/2018    Procedure: Left heart cath;  Surgeon: Gino Olson MD;  Location: [facility] CATH;  Service: Cardiology;  Laterality: Left;    NASAL SEPTUM SURGERY      TONSILLECTOMY, ADENOIDECTOMY, BILATERAL MYRINGOTOMY AND TUBES       Current Outpatient Medications on File Prior to Visit   Medication Sig Dispense Refill    atorvastatin (LIPITOR) 80 MG tablet Take 1 tablet (80 mg total) by mouth every evening. 90 tablet 3    esomeprazole (NEXIUM) 40 MG capsule TAKE 1 CAPSULE (40 MG TOTAL) BY MOUTH ONCE DAILY. 90 capsule 3    metoprolol succinate (TOPROL-XL) 25 MG 24 hr tablet Take 25 mg by mouth once daily.  10    nitroGLYCERIN (NITROSTAT) 0.4 MG SL tablet Place 1 tablet (0.4 mg total) under the tongue every 5 (five) minutes as needed for Chest pain. 100 tablet 0    sildenafil (REVATIO) 20 mg Tab Take 1 tablet (20 mg total) by mouth 3 (three) times daily as needed. 30 tablet 3    venlafaxine (EFFEXOR-XR) 75 MG 24 hr capsule Take 1 capsule (75 mg  total) by mouth once daily. 90 capsule 0    aspirin (ECOTRIN) 81 MG EC tablet Take 1 tablet (81 mg total) by mouth once daily.  0    famotidine (PEPCID) 20 MG tablet Take 1 tablet (20 mg total) by mouth 2 (two) times daily. 60 tablet 11    lisinopril (PRINIVIL,ZESTRIL) 2.5 MG tablet Take 1 tablet (2.5 mg total) by mouth once daily. 90 tablet 3    ticagrelor (BRILINTA) 90 mg tablet Take 1 tablet (90 mg total) by mouth 2 (two) times daily. 60 tablet 11     No current facility-administered medications on file prior to visit.          FAMILY HISTORY:  Father: CABG at 67, prostate cancer  Mother:alzheimer's  3 sisters  1 brother    SOCIAL HISTORY:  Tobacco use: variable (1/2 PPD)  Alcohol use:weekly wine  Ilicit drug use:denies  Occupation:residential development,   Marital status:  Children:5    HEALTH MAINTANENCE:  stress test 2009 normal    REVIEW OF SYSTEMS:  GENERAL: No fever, chills, fatigability or weight changes.  SKIN/BREASTS: No rashes, sores, itching or changes in color or texture of skin.  HEAD: No headaches or recent head trauma.   EYES: Visual acuity fine. Denies blurriness, tearing, itching, photophobia, diplopia, or visual changes.   EARS: Denies ear pain, discharge, tinnitus or vertigo.  NOSE: No loss of smell, epistaxis, postnasal drip, discharge, obstruction, or sneezing.  MOUTH & THROAT: No hoarseness, change in voice, swallowing difficulty. No excessive gum bleeding.   HEMATOLOGICAL/NODES: Denies swollen glands. No bleeding or bruising.  CHEST: No KOVACS, cyanosis, wheezing, cough or sputum production.  CARDIOVASCULAR: Denies dyspnea, orthopnea, or palpitations.  GI/ABDOMEN: Appetite fine. No weight loss. Denies nausea, vomiting, diarrhea, constipation, abdominal pain, hematemesis or blood in stool.  URINARY: No dysuria,hematuria, nocturia, incontinence, flank pain, urgency, or urinary difficulty.  PERIPHERAL VASCULAR: No claudication, cold intolerance or cyanosis.  MUSCULOSKELETAL:  "No joint stiffness, pain, swelling, or loss of strength. Denies back pain.  NEUROLOGIC: No history of seizures, paralysis, alteration of gait or coordination.    PHYSICAL EXAM:  /86 (BP Location: Left arm, Patient Position: Sitting)   Pulse 89   Temp 98 °F (36.7 °C) (Oral)   Ht 5' 7" (1.702 m)   Wt 85.5 kg (188 lb 7.9 oz)   SpO2 96%   BMI 29.52 kg/m²     APPEARANCE: Well nourished, well developed, neatly groomed, in no acute distress.   SKIN: Warm, moist and dry. No lesions or rashes.  NECK: Supple with full range of motion. No masses. No thyromegaly. No JVD or bruits.  CHEST: CTA bilaterally  CV: S1S2 rrr no m/r/g  EXTREMITIES: No edema or cyanosis. Pedal pulses 2+ bilaterally. No varicosities   Gait & Posture: Normal gait and fine motion.  PSYCH: Awake, alert, oriented to person, place, time, and situation. Pleasant and cooperative mood with intact judgment.    ASSESSMENT/PLAN:    Anxiety    Coronary artery disease involving native coronary artery of native heart with unstable angina pectoris    Preventative health care  -     Comprehensive metabolic panel; Future; Expected date: 05/04/2020  -     Lipid panel; Future; Expected date: 05/04/2020  -     PSA, Screening; Future; Expected date: 05/04/2020  -     CBC auto differential; Future; Expected date: 05/04/2020    SHAYAN (generalized anxiety disorder)  -     ALPRAZolam (XANAX) 1 MG tablet; Take 1 tablet (1 mg total) by mouth 2 (two) times daily.  Dispense: 60 tablet; Refill: 5    Screening for cancer  -     CT Chest Lung Screening Low Dose; Future; Expected date: 11/06/2019    Diaz's esophagus without dysplasia      Counseled on regular exercise, maintenance of a healthy weight, balanced diet rich in fruits/vegetables and lean protein, and avoidance of unhealthy habits like smoking and excessive alcohol intake.  tobacco use disorder - continue tobacco cessation efforts  Xanax 1mg prn anxiety  Effexor 75mg daily  continue Nexium  F/u 6 months with " labs

## 2019-11-08 ENCOUNTER — TELEPHONE (OUTPATIENT)
Dept: FAMILY MEDICINE | Facility: CLINIC | Age: 64
End: 2019-11-08

## 2019-11-08 NOTE — TELEPHONE ENCOUNTER
Hello! Referral # [id number] for MRI/CAT Scan for the patient is not medically necessary per Dr. Camejo.

## 2020-04-15 ENCOUNTER — PATIENT MESSAGE (OUTPATIENT)
Dept: FAMILY MEDICINE | Facility: CLINIC | Age: 65
End: 2020-04-15

## 2020-04-15 RX ORDER — SILDENAFIL CITRATE 20 MG/1
20 TABLET ORAL 3 TIMES DAILY PRN
Qty: 30 TABLET | Refills: 3 | Status: SHIPPED | OUTPATIENT
Start: 2020-04-15 | End: 2021-01-15

## 2020-04-29 ENCOUNTER — LAB VISIT (OUTPATIENT)
Dept: LAB | Facility: HOSPITAL | Age: 65
End: 2020-04-29
Attending: FAMILY MEDICINE
Payer: COMMERCIAL

## 2020-04-29 DIAGNOSIS — Z00.00 PREVENTATIVE HEALTH CARE: ICD-10-CM

## 2020-04-29 LAB
ALBUMIN SERPL BCP-MCNC: 3.7 G/DL (ref 3.5–5.2)
ALP SERPL-CCNC: 104 U/L (ref 55–135)
ALT SERPL W/O P-5'-P-CCNC: 19 U/L (ref 10–44)
ANION GAP SERPL CALC-SCNC: 6 MMOL/L (ref 8–16)
AST SERPL-CCNC: 21 U/L (ref 10–40)
BASOPHILS # BLD AUTO: 0.07 K/UL (ref 0–0.2)
BASOPHILS NFR BLD: 0.6 % (ref 0–1.9)
BILIRUB SERPL-MCNC: 0.6 MG/DL (ref 0.1–1)
BUN SERPL-MCNC: 12 MG/DL (ref 8–23)
CALCIUM SERPL-MCNC: 9.6 MG/DL (ref 8.7–10.5)
CHLORIDE SERPL-SCNC: 106 MMOL/L (ref 95–110)
CHOLEST SERPL-MCNC: 120 MG/DL (ref 120–199)
CHOLEST/HDLC SERPL: 3.2 {RATIO} (ref 2–5)
CO2 SERPL-SCNC: 30 MMOL/L (ref 23–29)
COMPLEXED PSA SERPL-MCNC: 3.2 NG/ML (ref 0–4)
CREAT SERPL-MCNC: 1.2 MG/DL (ref 0.5–1.4)
DIFFERENTIAL METHOD: ABNORMAL
EOSINOPHIL # BLD AUTO: 0.5 K/UL (ref 0–0.5)
EOSINOPHIL NFR BLD: 4.4 % (ref 0–8)
ERYTHROCYTE [DISTWIDTH] IN BLOOD BY AUTOMATED COUNT: 14.1 % (ref 11.5–14.5)
EST. GFR  (AFRICAN AMERICAN): >60 ML/MIN/1.73 M^2
EST. GFR  (NON AFRICAN AMERICAN): >60 ML/MIN/1.73 M^2
GLUCOSE SERPL-MCNC: 112 MG/DL (ref 70–110)
HCT VFR BLD AUTO: 51.6 % (ref 40–54)
HDLC SERPL-MCNC: 37 MG/DL (ref 40–75)
HDLC SERPL: 30.8 % (ref 20–50)
HGB BLD-MCNC: 16.6 G/DL (ref 14–18)
IMM GRANULOCYTES # BLD AUTO: 0.02 K/UL (ref 0–0.04)
IMM GRANULOCYTES NFR BLD AUTO: 0.2 % (ref 0–0.5)
LDLC SERPL CALC-MCNC: 67.8 MG/DL (ref 63–159)
LYMPHOCYTES # BLD AUTO: 4.6 K/UL (ref 1–4.8)
LYMPHOCYTES NFR BLD: 39.8 % (ref 18–48)
MCH RBC QN AUTO: 31.3 PG (ref 27–31)
MCHC RBC AUTO-ENTMCNC: 32.2 G/DL (ref 32–36)
MCV RBC AUTO: 97 FL (ref 82–98)
MONOCYTES # BLD AUTO: 1 K/UL (ref 0.3–1)
MONOCYTES NFR BLD: 8.8 % (ref 4–15)
NEUTROPHILS # BLD AUTO: 5.3 K/UL (ref 1.8–7.7)
NEUTROPHILS NFR BLD: 46.2 % (ref 38–73)
NONHDLC SERPL-MCNC: 83 MG/DL
NRBC BLD-RTO: 0 /100 WBC
PLATELET # BLD AUTO: 285 K/UL (ref 150–350)
PMV BLD AUTO: 11 FL (ref 9.2–12.9)
POTASSIUM SERPL-SCNC: 5 MMOL/L (ref 3.5–5.1)
PROT SERPL-MCNC: 7.5 G/DL (ref 6–8.4)
RBC # BLD AUTO: 5.3 M/UL (ref 4.6–6.2)
SODIUM SERPL-SCNC: 142 MMOL/L (ref 136–145)
TRIGL SERPL-MCNC: 76 MG/DL (ref 30–150)
WBC # BLD AUTO: 11.47 K/UL (ref 3.9–12.7)

## 2020-04-29 PROCEDURE — 80053 COMPREHEN METABOLIC PANEL: CPT

## 2020-04-29 PROCEDURE — 80061 LIPID PANEL: CPT

## 2020-04-29 PROCEDURE — 85025 COMPLETE CBC W/AUTO DIFF WBC: CPT

## 2020-04-29 PROCEDURE — 36415 COLL VENOUS BLD VENIPUNCTURE: CPT | Mod: PO

## 2020-04-29 PROCEDURE — 84153 ASSAY OF PSA TOTAL: CPT

## 2020-05-06 ENCOUNTER — OFFICE VISIT (OUTPATIENT)
Dept: FAMILY MEDICINE | Facility: CLINIC | Age: 65
End: 2020-05-06
Payer: COMMERCIAL

## 2020-05-06 DIAGNOSIS — F41.1 GAD (GENERALIZED ANXIETY DISORDER): ICD-10-CM

## 2020-05-06 DIAGNOSIS — K22.70 BARRETT'S ESOPHAGUS WITHOUT DYSPLASIA: ICD-10-CM

## 2020-05-06 DIAGNOSIS — I25.110 CORONARY ARTERY DISEASE INVOLVING NATIVE CORONARY ARTERY OF NATIVE HEART WITH UNSTABLE ANGINA PECTORIS: ICD-10-CM

## 2020-05-06 DIAGNOSIS — F41.9 ANXIETY: Primary | ICD-10-CM

## 2020-05-06 PROCEDURE — 99213 PR OFFICE/OUTPT VISIT, EST, LEVL III, 20-29 MIN: ICD-10-PCS | Mod: 95,,, | Performed by: FAMILY MEDICINE

## 2020-05-06 PROCEDURE — 99213 OFFICE O/P EST LOW 20 MIN: CPT | Mod: 95,,, | Performed by: FAMILY MEDICINE

## 2020-05-06 RX ORDER — ALPRAZOLAM 1 MG/1
1 TABLET ORAL 2 TIMES DAILY
Qty: 60 TABLET | Refills: 5 | Status: SHIPPED | OUTPATIENT
Start: 2020-05-06 | End: 2020-11-09 | Stop reason: SDUPTHER

## 2020-05-06 RX ORDER — ESOMEPRAZOLE MAGNESIUM 40 MG/1
40 CAPSULE, DELAYED RELEASE ORAL DAILY
Qty: 90 CAPSULE | Refills: 3 | Status: SHIPPED | OUTPATIENT
Start: 2020-05-06 | End: 2021-06-30

## 2020-05-06 NOTE — PROGRESS NOTES
This is a 64-year-old white male who presents for 6 month f/u on chronic health issues    The patient location is: home  The chief complaint leading to consultation is: anxiety  Visit type: audiovisual  Total time spent with patient: 15 minutes  Each patient to whom he or she provides medical services by telemedicine is:  (1) informed of the relationship between the physician and patient and the respective role of any other health care provider with respect to management of the patient; and (2) notified that he or she may decline to receive medical services by telemedicine and may withdraw from such care at any time.    Overall doing well  CAD s/p stents, previous MI - tolerating Lipitor 80mg, toprol, lisinopril 2.5mg, Brilinta BID, ASA 81mg; following with cardiology, Dr. Muñoz  Anxiety -  Tolerating Effexor XR 75mg daily.  Mood is controlled. He is taking Xanax 1mg 1.5 - 2 tabs daily.   Barretts - doing well on the Nexium 40 mg daily  Wearing hearing aids      Past Medical History:   Diagnosis Date    Anxiety     Diaz esophagus     BPH (benign prostatic hyperplasia)     CAD (coronary artery disease)     Essential tremor     HEARING LOSS     hearing aids    History of MI (myocardial infarction)     HLD (hyperlipidemia)     Shingles     right upper back     Past Surgical History:   Procedure Laterality Date    CORONARY STENT PLACEMENT      RCA, LAD    LEFT HEART CATHETERIZATION Left 6/15/2018    Procedure: Left heart cath;  Surgeon: Gino Olson MD;  Location: [facility] CATH;  Service: Cardiology;  Laterality: Left;    NASAL SEPTUM SURGERY      TONSILLECTOMY, ADENOIDECTOMY, BILATERAL MYRINGOTOMY AND TUBES       Current Outpatient Medications on File Prior to Visit   Medication Sig Dispense Refill    aspirin (ECOTRIN) 81 MG EC tablet Take 1 tablet (81 mg total) by mouth once daily.  0    atorvastatin (LIPITOR) 80 MG tablet Take 1 tablet (80 mg total) by mouth every evening. 90 tablet 3     famotidine (PEPCID) 20 MG tablet Take 1 tablet (20 mg total) by mouth 2 (two) times daily. 60 tablet 11    lisinopril (PRINIVIL,ZESTRIL) 2.5 MG tablet Take 1 tablet (2.5 mg total) by mouth once daily. 90 tablet 3    metoprolol succinate (TOPROL-XL) 25 MG 24 hr tablet Take 25 mg by mouth once daily.  10    nitroGLYCERIN (NITROSTAT) 0.4 MG SL tablet Place 1 tablet (0.4 mg total) under the tongue every 5 (five) minutes as needed for Chest pain. 100 tablet 0    sildenafil (REVATIO) 20 mg Tab Take 1 tablet (20 mg total) by mouth 3 (three) times daily as needed. 30 tablet 3    ticagrelor (BRILINTA) 90 mg tablet Take 1 tablet (90 mg total) by mouth 2 (two) times daily. 60 tablet 11    venlafaxine (EFFEXOR-XR) 75 MG 24 hr capsule Take 1 capsule (75 mg total) by mouth once daily. 90 capsule 0    [DISCONTINUED] ALPRAZolam (XANAX) 1 MG tablet Take 1 tablet (1 mg total) by mouth 2 (two) times daily. 60 tablet 5    [DISCONTINUED] esomeprazole (NEXIUM) 40 MG capsule TAKE 1 CAPSULE (40 MG TOTAL) BY MOUTH ONCE DAILY. 90 capsule 3     No current facility-administered medications on file prior to visit.          FAMILY HISTORY:  Father: CABG at 67, prostate cancer  Mother:alzheimer's  3 sisters  1 brother    SOCIAL HISTORY:  Tobacco use: variable (1/2 PPD)  Alcohol use:weekly wine  Ilicit drug use:denies  Occupation:residential development,   Marital status:  Children:5    HEALTH MAINTANENCE:  stress test 2009 normal    REVIEW OF SYSTEMS:  GENERAL: No fever, chills, fatigability or weight changes.  SKIN/BREASTS: No rashes, sores, itching or changes in color or texture of skin.  HEAD: No headaches or recent head trauma.   EYES: Visual acuity fine. Denies blurriness, tearing, itching, photophobia, diplopia, or visual changes.   EARS: Denies ear pain, discharge, tinnitus or vertigo.  NOSE: No loss of smell, epistaxis, postnasal drip, discharge, obstruction, or sneezing.  MOUTH & THROAT: No hoarseness, change in  voice, swallowing difficulty. No excessive gum bleeding.   HEMATOLOGICAL/NODES: Denies swollen glands. No bleeding or bruising.  CHEST: No KOVACS, cyanosis, wheezing, cough or sputum production.  CARDIOVASCULAR: Denies dyspnea, orthopnea, or palpitations.  GI/ABDOMEN: Appetite fine. No weight loss. Denies nausea, vomiting, diarrhea, constipation, abdominal pain, hematemesis or blood in stool.  URINARY: No dysuria,hematuria, nocturia, incontinence, flank pain, urgency, or urinary difficulty.  PERIPHERAL VASCULAR: No claudication, cold intolerance or cyanosis.  MUSCULOSKELETAL: No joint stiffness, pain, swelling, or loss of strength. Denies back pain.  NEUROLOGIC: No history of seizures, paralysis, alteration of gait or coordination.    Answers for HPI/ROS submitted by the patient on 4/30/2020   activity change: No  unexpected weight change: No  neck pain: No  hearing loss: No  rhinorrhea: No  trouble swallowing: No  eye discharge: No  visual disturbance: No  chest tightness: No  wheezing: No  chest pain: No  palpitations: No  blood in stool: No  constipation: No  vomiting: No  diarrhea: No  polydipsia: No  polyuria: No  difficulty urinating: No  urgency: No  hematuria: No  joint swelling: No  arthralgias: No  headaches: No  weakness: No  confusion: No  dysphoric mood: No      PHYSICAL EXAM:    APPEARANCE: Well nourished, well developed, neatly groomed, in no acute distress.   SKIN: Warm, moist and dry. No lesions or rashes.  NECK: Supple with full range of motion. No masses. No thyromegaly. No JVD or bruits.  CHEST: CTA bilaterally  CV: S1S2 rrr no m/r/g  EXTREMITIES: No edema or cyanosis. Pedal pulses 2+ bilaterally. No varicosities   Gait & Posture: Normal gait and fine motion.  PSYCH: Awake, alert, oriented to person, place, time, and situation. Pleasant and cooperative mood with intact judgment.    Results for orders placed or performed in visit on 04/29/20   Comprehensive metabolic panel   Result Value Ref Range     Sodium 142 136 - 145 mmol/L    Potassium 5.0 3.5 - 5.1 mmol/L    Chloride 106 95 - 110 mmol/L    CO2 30 (H) 23 - 29 mmol/L    Glucose 112 (H) 70 - 110 mg/dL    BUN, Bld 12 8 - 23 mg/dL    Creatinine 1.2 0.5 - 1.4 mg/dL    Calcium 9.6 8.7 - 10.5 mg/dL    Total Protein 7.5 6.0 - 8.4 g/dL    Albumin 3.7 3.5 - 5.2 g/dL    Total Bilirubin 0.6 0.1 - 1.0 mg/dL    Alkaline Phosphatase 104 55 - 135 U/L    AST 21 10 - 40 U/L    ALT 19 10 - 44 U/L    Anion Gap 6 (L) 8 - 16 mmol/L    eGFR if African American >60.0 >60 mL/min/1.73 m^2    eGFR if non African American >60.0 >60 mL/min/1.73 m^2   Lipid panel   Result Value Ref Range    Cholesterol 120 120 - 199 mg/dL    Triglycerides 76 30 - 150 mg/dL    HDL 37 (L) 40 - 75 mg/dL    LDL Cholesterol 67.8 63.0 - 159.0 mg/dL    Hdl/Cholesterol Ratio 30.8 20.0 - 50.0 %    Total Cholesterol/HDL Ratio 3.2 2.0 - 5.0    Non-HDL Cholesterol 83 mg/dL   PSA, Screening   Result Value Ref Range    PSA, SCREEN 3.2 0.00 - 4.00 ng/mL   CBC auto differential   Result Value Ref Range    WBC 11.47 3.90 - 12.70 K/uL    RBC 5.30 4.60 - 6.20 M/uL    Hemoglobin 16.6 14.0 - 18.0 g/dL    Hematocrit 51.6 40.0 - 54.0 %    Mean Corpuscular Volume 97 82 - 98 fL    Mean Corpuscular Hemoglobin 31.3 (H) 27.0 - 31.0 pg    Mean Corpuscular Hemoglobin Conc 32.2 32.0 - 36.0 g/dL    RDW 14.1 11.5 - 14.5 %    Platelets 285 150 - 350 K/uL    MPV 11.0 9.2 - 12.9 fL    Immature Granulocytes 0.2 0.0 - 0.5 %    Gran # (ANC) 5.3 1.8 - 7.7 K/uL    Immature Grans (Abs) 0.02 0.00 - 0.04 K/uL    Lymph # 4.6 1.0 - 4.8 K/uL    Mono # 1.0 0.3 - 1.0 K/uL    Eos # 0.5 0.0 - 0.5 K/uL    Baso # 0.07 0.00 - 0.20 K/uL    nRBC 0 0 /100 WBC    Gran% 46.2 38.0 - 73.0 %    Lymph% 39.8 18.0 - 48.0 %    Mono% 8.8 4.0 - 15.0 %    Eosinophil% 4.4 0.0 - 8.0 %    Basophil% 0.6 0.0 - 1.9 %    Differential Method Automated          ASSESSMENT/PLAN:    Anxiety    Coronary artery disease involving native coronary artery of native heart with unstable  angina pectoris    SHAYAN (generalized anxiety disorder)  -     ALPRAZolam (XANAX) 1 MG tablet; Take 1 tablet (1 mg total) by mouth 2 (two) times daily.  Dispense: 60 tablet; Refill: 5    Diaz's esophagus without dysplasia  -     esomeprazole (NEXIUM) 40 MG capsule; Take 1 capsule (40 mg total) by mouth once daily.  Dispense: 90 capsule; Refill: 3    doing well  Counseled on regular exercise, maintenance of a healthy weight, balanced diet rich in fruits/vegetables and lean protein, and avoidance of unhealthy habits like smoking and excessive alcohol intake.  Xanax 1mg prn anxiety  Effexor 75mg daily  continue Nexium  F/u 6 months

## 2020-07-16 DIAGNOSIS — F41.1 GAD (GENERALIZED ANXIETY DISORDER): ICD-10-CM

## 2020-07-16 RX ORDER — VENLAFAXINE HYDROCHLORIDE 75 MG/1
CAPSULE, EXTENDED RELEASE ORAL
Qty: 90 CAPSULE | Refills: 3 | Status: SHIPPED | OUTPATIENT
Start: 2020-07-16 | End: 2021-07-21

## 2020-07-16 NOTE — PROGRESS NOTES
Refill Authorization Note    is requesting a refill authorization.    Brief assessment and rationale for refill: APPROVE: prr          Medication Therapy Plan: approve 12 more    Medication reconciliation completed: No                         Comments:      Requested Prescriptions   Signed Prescriptions Disp Refills    venlafaxine (EFFEXOR-XR) 75 MG 24 hr capsule 90 capsule 3     Sig: TAKE 1 CAPSULE BY MOUTH EVERY DAY       Psychiatry: Antidepressants - SNRI - desvenlafaxine & venlafaxine Passed - 7/16/2020  1:01 PM        Passed - Patient is at least 18 years old        Passed - Last BP in normal range within 360 days.     BP Readings from Last 3 Encounters:   11/06/19 130/86   05/06/19 110/80   11/06/18 114/86              Passed - Office visit in past 6 months or future 90 days.     Recent Outpatient Visits            2 months ago Anxiety    [facility] Red Camejo MD    8 months ago Anxiety    [facility] Red Camejo MD    1 year ago Preventative health care    [facility] Red Camejo MD    1 year ago NSTEMI (non-ST elevated myocardial infarction)    [facility] Red Camejo MD    2 years ago [facility] care    [facility] Red Camejo MD          Future Appointments              In 3 months Red Camejo MD [facility]                Passed - Cr is 1.3 or below and within 360 days     Creatinine   Date Value Ref Range Status   04/29/2020 1.2 0.5 - 1.4 mg/dL Final   06/17/2019 1.0 0.5 - 1.4 mg/dL Final   06/18/2018 1.00 0.80 - 1.50 mg/dL Final              Passed - eGFR within 360 days     eGFR if non    Date Value Ref Range Status   04/29/2020 >60.0 >60 mL/min/1.73 m^2 Final     Comment:     Calculation used to obtain the estimated glomerular filtration  rate (eGFR) is the CKD-EPI equation.      06/17/2019 >60.0 >60 mL/min/1.73  m^2 Final     Comment:     Calculation used to obtain the estimated glomerular filtration  rate (eGFR) is the CKD-EPI equation.      06/18/2018 >60 >60 mL/min/1.73 m^2 Final     Comment:     Calculation used to obtain the estimated glomerular filtration  rate (eGFR) is the CKD-EPI equation.        eGFR if    Date Value Ref Range Status   04/29/2020 >60.0 >60 mL/min/1.73 m^2 Final   06/17/2019 >60.0 >60 mL/min/1.73 m^2 Final   06/18/2018 >60 >60 mL/min/1.73 m^2 Final                  Appointments  past 12m or future 3m with PCP    Date Provider   Last Visit   5/6/2020 Red Camejo MD   Next Visit   11/9/2020 Red Camejo MD   ED visits in past 90 days: 0     Note composed:5:39 PM 07/16/2020

## 2020-11-09 ENCOUNTER — OFFICE VISIT (OUTPATIENT)
Dept: FAMILY MEDICINE | Facility: CLINIC | Age: 65
End: 2020-11-09
Payer: COMMERCIAL

## 2020-11-09 VITALS
HEART RATE: 86 BPM | BODY MASS INDEX: 30.13 KG/M2 | DIASTOLIC BLOOD PRESSURE: 88 MMHG | TEMPERATURE: 98 F | SYSTOLIC BLOOD PRESSURE: 112 MMHG | HEIGHT: 67 IN | OXYGEN SATURATION: 98 % | WEIGHT: 192 LBS

## 2020-11-09 DIAGNOSIS — F41.1 GAD (GENERALIZED ANXIETY DISORDER): ICD-10-CM

## 2020-11-09 DIAGNOSIS — Z00.00 PREVENTATIVE HEALTH CARE: Primary | ICD-10-CM

## 2020-11-09 PROCEDURE — 99396 PREV VISIT EST AGE 40-64: CPT | Mod: S$GLB,,, | Performed by: FAMILY MEDICINE

## 2020-11-09 PROCEDURE — 3008F PR BODY MASS INDEX (BMI) DOCUMENTED: ICD-10-PCS | Mod: CPTII,S$GLB,, | Performed by: FAMILY MEDICINE

## 2020-11-09 PROCEDURE — 3008F BODY MASS INDEX DOCD: CPT | Mod: CPTII,S$GLB,, | Performed by: FAMILY MEDICINE

## 2020-11-09 PROCEDURE — 99999 PR PBB SHADOW E&M-EST. PATIENT-LVL III: CPT | Mod: PBBFAC,,, | Performed by: FAMILY MEDICINE

## 2020-11-09 PROCEDURE — 99999 PR PBB SHADOW E&M-EST. PATIENT-LVL III: ICD-10-PCS | Mod: PBBFAC,,, | Performed by: FAMILY MEDICINE

## 2020-11-09 PROCEDURE — 99396 PR PREVENTIVE VISIT,EST,40-64: ICD-10-PCS | Mod: S$GLB,,, | Performed by: FAMILY MEDICINE

## 2020-11-09 RX ORDER — ALPRAZOLAM 1 MG/1
1 TABLET ORAL 2 TIMES DAILY
Qty: 60 TABLET | Refills: 5 | Status: SHIPPED | OUTPATIENT
Start: 2020-11-09 | End: 2021-05-10 | Stop reason: SDUPTHER

## 2020-11-09 NOTE — PROGRESS NOTES
This is a 65-year-old white male who presents for annual exam and 6 month f/u on chronic health issues    Overall doing well  CAD s/p stents, previous MI - tolerating Lipitor 80mg, toprol, lisinopril 2.5mg, Brilinta BID, ASA 81mg; following with cardiology, Dr. Muñoz  Anxiety -  Tolerating Effexor XR 75mg daily.  Mood is controlled. He is taking Xanax 1mg 1.5 - 2 tabs daily.   Barretts - doing well on the Nexium 40 mg daily  Wearing hearing aids      Past Medical History:   Diagnosis Date    Anxiety     Diaz esophagus     BPH (benign prostatic hyperplasia)     CAD (coronary artery disease)     Essential tremor     HEARING LOSS     hearing aids    History of MI (myocardial infarction)     HLD (hyperlipidemia)     Shingles     right upper back     Past Surgical History:   Procedure Laterality Date    CORONARY STENT PLACEMENT      RCA, LAD    LEFT HEART CATHETERIZATION Left 6/15/2018    Procedure: Left heart cath;  Surgeon: Gino Olson MD;  Location: [facility] CATH;  Service: Cardiology;  Laterality: Left;    NASAL SEPTUM SURGERY      TONSILLECTOMY, ADENOIDECTOMY, BILATERAL MYRINGOTOMY AND TUBES       Current Outpatient Medications on File Prior to Visit   Medication Sig Dispense Refill    esomeprazole (NEXIUM) 40 MG capsule Take 1 capsule (40 mg total) by mouth once daily. 90 capsule 3    metoprolol succinate (TOPROL-XL) 25 MG 24 hr tablet Take 25 mg by mouth once daily.  10    sildenafil (REVATIO) 20 mg Tab Take 1 tablet (20 mg total) by mouth 3 (three) times daily as needed. 30 tablet 3    venlafaxine (EFFEXOR-XR) 75 MG 24 hr capsule TAKE 1 CAPSULE BY MOUTH EVERY DAY 90 capsule 3    [DISCONTINUED] ALPRAZolam (XANAX) 1 MG tablet Take 1 tablet (1 mg total) by mouth 2 (two) times daily. 60 tablet 5    aspirin (ECOTRIN) 81 MG EC tablet Take 1 tablet (81 mg total) by mouth once daily.  0    atorvastatin (LIPITOR) 80 MG tablet Take 1 tablet (80 mg total) by mouth every evening. 90 tablet 3     lisinopril (PRINIVIL,ZESTRIL) 2.5 MG tablet Take 1 tablet (2.5 mg total) by mouth once daily. 90 tablet 3    nitroGLYCERIN (NITROSTAT) 0.4 MG SL tablet Place 1 tablet (0.4 mg total) under the tongue every 5 (five) minutes as needed for Chest pain. 100 tablet 0    ticagrelor (BRILINTA) 90 mg tablet Take 1 tablet (90 mg total) by mouth 2 (two) times daily. 60 tablet 11    [DISCONTINUED] famotidine (PEPCID) 20 MG tablet Take 1 tablet (20 mg total) by mouth 2 (two) times daily. 60 tablet 11     No current facility-administered medications on file prior to visit.          FAMILY HISTORY:  Father: CABG at 67, prostate cancer  Mother:alzheimer's  3 sisters  1 brother    SOCIAL HISTORY:  Tobacco use: variable (1/2 PPD)  Alcohol use:weekly wine  Ilicit drug use:denies  Occupation:residential development,   Marital status:  Children:5    REVIEW OF SYSTEMS:  GENERAL: No fever, chills, fatigability or weight changes.  SKIN/BREASTS: No rashes, sores, itching or changes in color or texture of skin.  HEAD: No headaches or recent head trauma.   EYES: Visual acuity fine. Denies blurriness, tearing, itching, photophobia, diplopia, or visual changes.   EARS: Denies ear pain, discharge, tinnitus or vertigo.  NOSE: No loss of smell, epistaxis, postnasal drip, discharge, obstruction, or sneezing.  MOUTH & THROAT: No hoarseness, change in voice, swallowing difficulty. No excessive gum bleeding.   HEMATOLOGICAL/NODES: Denies swollen glands. No bleeding or bruising.  CHEST: No KOVACS, cyanosis, wheezing, cough or sputum production.  CARDIOVASCULAR: Denies dyspnea, orthopnea, or palpitations.  GI/ABDOMEN: Appetite fine. No weight loss. Denies nausea, vomiting, diarrhea, constipation, abdominal pain, hematemesis or blood in stool.  URINARY: No dysuria,hematuria, nocturia, incontinence, flank pain, urgency, or urinary difficulty.  PERIPHERAL VASCULAR: No claudication, cold intolerance or cyanosis.  MUSCULOSKELETAL: No joint  "stiffness, pain, swelling, or loss of strength. Denies back pain.  NEUROLOGIC: No history of seizures, paralysis, alteration of gait or coordination.      PHYSICAL EXAM:  /88 (BP Location: Right arm, Patient Position: Sitting)   Pulse 86   Temp 98.4 °F (36.9 °C) (Oral)   Ht 5' 7" (1.702 m)   Wt 87.1 kg (192 lb 0.3 oz)   SpO2 98%   BMI 30.07 kg/m²     APPEARANCE: Well nourished, well developed, neatly groomed, in no acute distress.   SKIN: Warm, moist and dry. No lesions or rashes.  NECK: Supple with full range of motion. No masses. No thyromegaly. No JVD or bruits.  CHEST: CTA bilaterally  CV: S1S2 rrr no m/r/g  EXTREMITIES: No edema or cyanosis. Pedal pulses 2+ bilaterally. No varicosities   Gait & Posture: Normal gait and fine motion.  PSYCH: Awake, alert, oriented to person, place, time, and situation. Pleasant and cooperative mood with intact judgment.    Results for orders placed or performed in visit on 04/29/20   Comprehensive metabolic panel   Result Value Ref Range    Sodium 142 136 - 145 mmol/L    Potassium 5.0 3.5 - 5.1 mmol/L    Chloride 106 95 - 110 mmol/L    CO2 30 (H) 23 - 29 mmol/L    Glucose 112 (H) 70 - 110 mg/dL    BUN 12 8 - 23 mg/dL    Creatinine 1.2 0.5 - 1.4 mg/dL    Calcium 9.6 8.7 - 10.5 mg/dL    Total Protein 7.5 6.0 - 8.4 g/dL    Albumin 3.7 3.5 - 5.2 g/dL    Total Bilirubin 0.6 0.1 - 1.0 mg/dL    Alkaline Phosphatase 104 55 - 135 U/L    AST 21 10 - 40 U/L    ALT 19 10 - 44 U/L    Anion Gap 6 (L) 8 - 16 mmol/L    eGFR if African American >60.0 >60 mL/min/1.73 m^2    eGFR if non African American >60.0 >60 mL/min/1.73 m^2   Lipid panel   Result Value Ref Range    Cholesterol 120 120 - 199 mg/dL    Triglycerides 76 30 - 150 mg/dL    HDL 37 (L) 40 - 75 mg/dL    LDL Cholesterol 67.8 63.0 - 159.0 mg/dL    HDL/Cholesterol Ratio 30.8 20.0 - 50.0 %    Total Cholesterol/HDL Ratio 3.2 2.0 - 5.0    Non-HDL Cholesterol 83 mg/dL   PSA, Screening   Result Value Ref Range    PSA, Screen 3.2 " 0.00 - 4.00 ng/mL   CBC auto differential   Result Value Ref Range    WBC 11.47 3.90 - 12.70 K/uL    RBC 5.30 4.60 - 6.20 M/uL    Hemoglobin 16.6 14.0 - 18.0 g/dL    Hematocrit 51.6 40.0 - 54.0 %    MCV 97 82 - 98 fL    MCH 31.3 (H) 27.0 - 31.0 pg    MCHC 32.2 32.0 - 36.0 g/dL    RDW 14.1 11.5 - 14.5 %    Platelets 285 150 - 350 K/uL    MPV 11.0 9.2 - 12.9 fL    Immature Granulocytes 0.2 0.0 - 0.5 %    Gran # (ANC) 5.3 1.8 - 7.7 K/uL    Immature Grans (Abs) 0.02 0.00 - 0.04 K/uL    Lymph # 4.6 1.0 - 4.8 K/uL    Mono # 1.0 0.3 - 1.0 K/uL    Eos # 0.5 0.0 - 0.5 K/uL    Baso # 0.07 0.00 - 0.20 K/uL    nRBC 0 0 /100 WBC    Gran % 46.2 38.0 - 73.0 %    Lymph % 39.8 18.0 - 48.0 %    Mono % 8.8 4.0 - 15.0 %    Eosinophil % 4.4 0.0 - 8.0 %    Basophil % 0.6 0.0 - 1.9 %    Differential Method Automated          ASSESSMENT/PLAN:    Preventative health care    SHAYAN (generalized anxiety disorder)  -     ALPRAZolam (XANAX) 1 MG tablet; Take 1 tablet (1 mg total) by mouth 2 (two) times daily.  Dispense: 60 tablet; Refill: 5       doing well  Counseled on regular exercise, maintenance of a healthy weight, balanced diet rich in fruits/vegetables and lean protein, and avoidance of unhealthy habits like smoking and excessive alcohol intake.  Xanax 1mg prn anxiety  Effexor 75mg daily  continue Nexium  Continue other present meds and annual cardiology follow-up  F/u 6 months

## 2021-01-18 RX ORDER — SILDENAFIL CITRATE 20 MG/1
TABLET ORAL
Qty: 30 TABLET | Refills: 3 | Status: SHIPPED | OUTPATIENT
Start: 2021-01-18 | End: 2022-01-23

## 2021-02-10 ENCOUNTER — PATIENT MESSAGE (OUTPATIENT)
Dept: FAMILY MEDICINE | Facility: CLINIC | Age: 66
End: 2021-02-10

## 2021-02-10 RX ORDER — ATORVASTATIN CALCIUM 80 MG/1
TABLET, FILM COATED ORAL
Qty: 90 TABLET | Refills: 0 | Status: SHIPPED | OUTPATIENT
Start: 2021-02-10 | End: 2021-05-17 | Stop reason: SDUPTHER

## 2021-05-10 ENCOUNTER — OFFICE VISIT (OUTPATIENT)
Dept: FAMILY MEDICINE | Facility: CLINIC | Age: 66
End: 2021-05-10
Payer: COMMERCIAL

## 2021-05-10 VITALS
HEIGHT: 67 IN | SYSTOLIC BLOOD PRESSURE: 132 MMHG | TEMPERATURE: 98 F | DIASTOLIC BLOOD PRESSURE: 70 MMHG | WEIGHT: 185.44 LBS | BODY MASS INDEX: 29.1 KG/M2 | HEART RATE: 76 BPM | RESPIRATION RATE: 18 BRPM

## 2021-05-10 DIAGNOSIS — F41.1 GAD (GENERALIZED ANXIETY DISORDER): ICD-10-CM

## 2021-05-10 DIAGNOSIS — F41.9 ANXIETY: Primary | ICD-10-CM

## 2021-05-10 PROCEDURE — 99213 OFFICE O/P EST LOW 20 MIN: CPT | Mod: S$GLB,,, | Performed by: FAMILY MEDICINE

## 2021-05-10 PROCEDURE — 3008F BODY MASS INDEX DOCD: CPT | Mod: CPTII,S$GLB,, | Performed by: FAMILY MEDICINE

## 2021-05-10 PROCEDURE — 3008F PR BODY MASS INDEX (BMI) DOCUMENTED: ICD-10-PCS | Mod: CPTII,S$GLB,, | Performed by: FAMILY MEDICINE

## 2021-05-10 PROCEDURE — 99999 PR PBB SHADOW E&M-EST. PATIENT-LVL III: CPT | Mod: PBBFAC,,, | Performed by: FAMILY MEDICINE

## 2021-05-10 PROCEDURE — 1126F AMNT PAIN NOTED NONE PRSNT: CPT | Mod: S$GLB,,, | Performed by: FAMILY MEDICINE

## 2021-05-10 PROCEDURE — 99999 PR PBB SHADOW E&M-EST. PATIENT-LVL III: ICD-10-PCS | Mod: PBBFAC,,, | Performed by: FAMILY MEDICINE

## 2021-05-10 PROCEDURE — 99213 PR OFFICE/OUTPT VISIT, EST, LEVL III, 20-29 MIN: ICD-10-PCS | Mod: S$GLB,,, | Performed by: FAMILY MEDICINE

## 2021-05-10 PROCEDURE — 1126F PR PAIN SEVERITY QUANTIFIED, NO PAIN PRESENT: ICD-10-PCS | Mod: S$GLB,,, | Performed by: FAMILY MEDICINE

## 2021-05-10 RX ORDER — AMOXICILLIN 500 MG/1
500 CAPSULE ORAL 3 TIMES DAILY
COMMUNITY
Start: 2021-05-03 | End: 2021-10-27

## 2021-05-10 RX ORDER — ALPRAZOLAM 1 MG/1
1 TABLET ORAL 2 TIMES DAILY
Qty: 60 TABLET | Refills: 5 | Status: SHIPPED | OUTPATIENT
Start: 2021-05-10 | End: 2021-11-10 | Stop reason: SDUPTHER

## 2021-05-20 RX ORDER — ATORVASTATIN CALCIUM 80 MG/1
80 TABLET, FILM COATED ORAL NIGHTLY
Qty: 90 TABLET | Refills: 3 | Status: SHIPPED | OUTPATIENT
Start: 2021-05-20 | End: 2022-05-12

## 2021-05-20 RX ORDER — ATORVASTATIN CALCIUM 80 MG/1
TABLET, FILM COATED ORAL
Qty: 90 TABLET | Refills: 0 | OUTPATIENT
Start: 2021-05-20

## 2021-06-28 DIAGNOSIS — K22.70 BARRETT'S ESOPHAGUS WITHOUT DYSPLASIA: ICD-10-CM

## 2021-06-30 RX ORDER — ESOMEPRAZOLE MAGNESIUM 40 MG/1
CAPSULE, DELAYED RELEASE ORAL
Qty: 90 CAPSULE | Refills: 3 | Status: SHIPPED | OUTPATIENT
Start: 2021-06-30 | End: 2022-06-30

## 2021-07-20 DIAGNOSIS — F41.1 GAD (GENERALIZED ANXIETY DISORDER): ICD-10-CM

## 2021-07-21 RX ORDER — VENLAFAXINE HYDROCHLORIDE 75 MG/1
75 CAPSULE, EXTENDED RELEASE ORAL DAILY
Qty: 90 CAPSULE | Refills: 2 | Status: SHIPPED | OUTPATIENT
Start: 2021-07-21 | End: 2022-04-11

## 2021-10-27 ENCOUNTER — OFFICE VISIT (OUTPATIENT)
Dept: FAMILY MEDICINE | Facility: CLINIC | Age: 66
End: 2021-10-27
Payer: COMMERCIAL

## 2021-10-27 VITALS
SYSTOLIC BLOOD PRESSURE: 132 MMHG | DIASTOLIC BLOOD PRESSURE: 98 MMHG | OXYGEN SATURATION: 97 % | BODY MASS INDEX: 29.65 KG/M2 | WEIGHT: 188.94 LBS | HEART RATE: 80 BPM | HEIGHT: 67 IN

## 2021-10-27 DIAGNOSIS — Z72.0 TOBACCO ABUSE: ICD-10-CM

## 2021-10-27 DIAGNOSIS — M54.6 ACUTE LEFT-SIDED THORACIC BACK PAIN: Primary | ICD-10-CM

## 2021-10-27 PROCEDURE — 3075F PR MOST RECENT SYSTOLIC BLOOD PRESS GE 130-139MM HG: ICD-10-PCS | Mod: CPTII,S$GLB,, | Performed by: FAMILY MEDICINE

## 2021-10-27 PROCEDURE — 4010F ACE/ARB THERAPY RXD/TAKEN: CPT | Mod: CPTII,S$GLB,, | Performed by: FAMILY MEDICINE

## 2021-10-27 PROCEDURE — 3080F DIAST BP >= 90 MM HG: CPT | Mod: CPTII,S$GLB,, | Performed by: FAMILY MEDICINE

## 2021-10-27 PROCEDURE — 3008F PR BODY MASS INDEX (BMI) DOCUMENTED: ICD-10-PCS | Mod: CPTII,S$GLB,, | Performed by: FAMILY MEDICINE

## 2021-10-27 PROCEDURE — 99214 PR OFFICE/OUTPT VISIT, EST, LEVL IV, 30-39 MIN: ICD-10-PCS | Mod: S$GLB,,, | Performed by: FAMILY MEDICINE

## 2021-10-27 PROCEDURE — 99999 PR PBB SHADOW E&M-EST. PATIENT-LVL III: CPT | Mod: PBBFAC,,, | Performed by: FAMILY MEDICINE

## 2021-10-27 PROCEDURE — 3008F BODY MASS INDEX DOCD: CPT | Mod: CPTII,S$GLB,, | Performed by: FAMILY MEDICINE

## 2021-10-27 PROCEDURE — 1160F RVW MEDS BY RX/DR IN RCRD: CPT | Mod: CPTII,S$GLB,, | Performed by: FAMILY MEDICINE

## 2021-10-27 PROCEDURE — 1159F PR MEDICATION LIST DOCUMENTED IN MEDICAL RECORD: ICD-10-PCS | Mod: CPTII,S$GLB,, | Performed by: FAMILY MEDICINE

## 2021-10-27 PROCEDURE — 1160F PR REVIEW ALL MEDS BY PRESCRIBER/CLIN PHARMACIST DOCUMENTED: ICD-10-PCS | Mod: CPTII,S$GLB,, | Performed by: FAMILY MEDICINE

## 2021-10-27 PROCEDURE — 3075F SYST BP GE 130 - 139MM HG: CPT | Mod: CPTII,S$GLB,, | Performed by: FAMILY MEDICINE

## 2021-10-27 PROCEDURE — 4010F PR ACE/ARB THEARPY RXD/TAKEN: ICD-10-PCS | Mod: CPTII,S$GLB,, | Performed by: FAMILY MEDICINE

## 2021-10-27 PROCEDURE — 3080F PR MOST RECENT DIASTOLIC BLOOD PRESSURE >= 90 MM HG: ICD-10-PCS | Mod: CPTII,S$GLB,, | Performed by: FAMILY MEDICINE

## 2021-10-27 PROCEDURE — 99999 PR PBB SHADOW E&M-EST. PATIENT-LVL III: ICD-10-PCS | Mod: PBBFAC,,, | Performed by: FAMILY MEDICINE

## 2021-10-27 PROCEDURE — 1159F MED LIST DOCD IN RCRD: CPT | Mod: CPTII,S$GLB,, | Performed by: FAMILY MEDICINE

## 2021-10-27 PROCEDURE — 99214 OFFICE O/P EST MOD 30 MIN: CPT | Mod: S$GLB,,, | Performed by: FAMILY MEDICINE

## 2021-10-27 RX ORDER — CYCLOBENZAPRINE HCL 10 MG
10 TABLET ORAL 3 TIMES DAILY PRN
Qty: 30 TABLET | Refills: 0 | Status: SHIPPED | OUTPATIENT
Start: 2021-10-27 | End: 2021-11-06

## 2021-10-27 RX ORDER — NAPROXEN 500 MG/1
500 TABLET ORAL 2 TIMES DAILY
Qty: 30 TABLET | Refills: 0 | Status: SHIPPED | OUTPATIENT
Start: 2021-10-27 | End: 2021-11-10 | Stop reason: ALTCHOICE

## 2021-10-29 ENCOUNTER — HOSPITAL ENCOUNTER (OUTPATIENT)
Dept: RADIOLOGY | Facility: HOSPITAL | Age: 66
Discharge: HOME | End: 2021-10-29
Attending: FAMILY MEDICINE
Payer: COMMERCIAL

## 2021-10-29 DIAGNOSIS — Z72.0 TOBACCO ABUSE: ICD-10-CM

## 2021-10-29 PROCEDURE — 71271 CT CHEST LUNG SCREENING LOW DOSE: ICD-10-PCS | Mod: 26,,, | Performed by: RADIOLOGY

## 2021-10-29 PROCEDURE — 71271 CT THORAX LUNG CANCER SCR C-: CPT | Mod: 26,,, | Performed by: RADIOLOGY

## 2021-10-29 PROCEDURE — 71271 CT THORAX LUNG CANCER SCR C-: CPT | Mod: TC,PO

## 2021-11-01 ENCOUNTER — TELEPHONE (OUTPATIENT)
Dept: FAMILY MEDICINE | Facility: CLINIC | Age: 66
End: 2021-11-01
Payer: COMMERCIAL

## 2021-11-01 ENCOUNTER — PATIENT MESSAGE (OUTPATIENT)
Dept: FAMILY MEDICINE | Facility: CLINIC | Age: 66
End: 2021-11-01
Payer: COMMERCIAL

## 2021-11-01 DIAGNOSIS — R91.8 PULMONARY NODULES: Primary | ICD-10-CM

## 2021-11-01 DIAGNOSIS — R91.1 SOLITARY PULMONARY NODULE: ICD-10-CM

## 2021-11-01 DIAGNOSIS — K76.9 HEPATIC LESION: ICD-10-CM

## 2021-11-04 ENCOUNTER — HOSPITAL ENCOUNTER (OUTPATIENT)
Dept: RADIOLOGY | Facility: HOSPITAL | Age: 66
Discharge: HOME | End: 2021-11-04
Attending: FAMILY MEDICINE
Payer: COMMERCIAL

## 2021-11-04 DIAGNOSIS — K76.9 HEPATIC LESION: ICD-10-CM

## 2021-11-04 PROCEDURE — 76705 ECHO EXAM OF ABDOMEN: CPT | Mod: 26,,, | Performed by: RADIOLOGY

## 2021-11-04 PROCEDURE — 76705 ECHO EXAM OF ABDOMEN: CPT | Mod: TC,PO

## 2021-11-04 PROCEDURE — 76705 US ABDOMEN LIMITED: ICD-10-PCS | Mod: 26,,, | Performed by: RADIOLOGY

## 2021-11-10 ENCOUNTER — OFFICE VISIT (OUTPATIENT)
Dept: FAMILY MEDICINE | Facility: CLINIC | Age: 66
End: 2021-11-10
Payer: COMMERCIAL

## 2021-11-10 ENCOUNTER — LAB VISIT (OUTPATIENT)
Dept: LAB | Facility: HOSPITAL | Age: 66
End: 2021-11-10
Attending: FAMILY MEDICINE
Payer: COMMERCIAL

## 2021-11-10 VITALS
HEIGHT: 67 IN | SYSTOLIC BLOOD PRESSURE: 120 MMHG | DIASTOLIC BLOOD PRESSURE: 70 MMHG | TEMPERATURE: 98 F | RESPIRATION RATE: 18 BRPM | OXYGEN SATURATION: 98 % | BODY MASS INDEX: 29.48 KG/M2 | HEART RATE: 98 BPM | WEIGHT: 187.81 LBS

## 2021-11-10 DIAGNOSIS — Z12.5 PROSTATE CANCER SCREENING: ICD-10-CM

## 2021-11-10 DIAGNOSIS — I25.110 CORONARY ARTERY DISEASE INVOLVING NATIVE CORONARY ARTERY OF NATIVE HEART WITH UNSTABLE ANGINA PECTORIS: ICD-10-CM

## 2021-11-10 DIAGNOSIS — K76.89 HEPATIC CYST: ICD-10-CM

## 2021-11-10 DIAGNOSIS — F41.1 GAD (GENERALIZED ANXIETY DISORDER): ICD-10-CM

## 2021-11-10 DIAGNOSIS — F41.9 ANXIETY: Primary | ICD-10-CM

## 2021-11-10 DIAGNOSIS — K22.70 BARRETT'S ESOPHAGUS WITHOUT DYSPLASIA: ICD-10-CM

## 2021-11-10 LAB — COMPLEXED PSA SERPL-MCNC: 4 NG/ML (ref 0–4)

## 2021-11-10 PROCEDURE — 99999 PR PBB SHADOW E&M-EST. PATIENT-LVL III: CPT | Mod: PBBFAC,,, | Performed by: FAMILY MEDICINE

## 2021-11-10 PROCEDURE — 3078F DIAST BP <80 MM HG: CPT | Mod: CPTII,S$GLB,, | Performed by: FAMILY MEDICINE

## 2021-11-10 PROCEDURE — 36415 COLL VENOUS BLD VENIPUNCTURE: CPT | Mod: PO | Performed by: FAMILY MEDICINE

## 2021-11-10 PROCEDURE — 4010F PR ACE/ARB THEARPY RXD/TAKEN: ICD-10-PCS | Mod: CPTII,S$GLB,, | Performed by: FAMILY MEDICINE

## 2021-11-10 PROCEDURE — 99999 PR PBB SHADOW E&M-EST. PATIENT-LVL III: ICD-10-PCS | Mod: PBBFAC,,, | Performed by: FAMILY MEDICINE

## 2021-11-10 PROCEDURE — 4010F ACE/ARB THERAPY RXD/TAKEN: CPT | Mod: CPTII,S$GLB,, | Performed by: FAMILY MEDICINE

## 2021-11-10 PROCEDURE — 3074F PR MOST RECENT SYSTOLIC BLOOD PRESSURE < 130 MM HG: ICD-10-PCS | Mod: CPTII,S$GLB,, | Performed by: FAMILY MEDICINE

## 2021-11-10 PROCEDURE — 3078F PR MOST RECENT DIASTOLIC BLOOD PRESSURE < 80 MM HG: ICD-10-PCS | Mod: CPTII,S$GLB,, | Performed by: FAMILY MEDICINE

## 2021-11-10 PROCEDURE — 1159F PR MEDICATION LIST DOCUMENTED IN MEDICAL RECORD: ICD-10-PCS | Mod: CPTII,S$GLB,, | Performed by: FAMILY MEDICINE

## 2021-11-10 PROCEDURE — 3074F SYST BP LT 130 MM HG: CPT | Mod: CPTII,S$GLB,, | Performed by: FAMILY MEDICINE

## 2021-11-10 PROCEDURE — 99214 PR OFFICE/OUTPT VISIT, EST, LEVL IV, 30-39 MIN: ICD-10-PCS | Mod: S$GLB,,, | Performed by: FAMILY MEDICINE

## 2021-11-10 PROCEDURE — 84153 ASSAY OF PSA TOTAL: CPT | Performed by: FAMILY MEDICINE

## 2021-11-10 PROCEDURE — 1159F MED LIST DOCD IN RCRD: CPT | Mod: CPTII,S$GLB,, | Performed by: FAMILY MEDICINE

## 2021-11-10 PROCEDURE — 3008F PR BODY MASS INDEX (BMI) DOCUMENTED: ICD-10-PCS | Mod: CPTII,S$GLB,, | Performed by: FAMILY MEDICINE

## 2021-11-10 PROCEDURE — 99214 OFFICE O/P EST MOD 30 MIN: CPT | Mod: S$GLB,,, | Performed by: FAMILY MEDICINE

## 2021-11-10 PROCEDURE — 3008F BODY MASS INDEX DOCD: CPT | Mod: CPTII,S$GLB,, | Performed by: FAMILY MEDICINE

## 2021-11-10 RX ORDER — ALPRAZOLAM 1 MG/1
1 TABLET ORAL 2 TIMES DAILY
Qty: 60 TABLET | Refills: 5 | Status: SHIPPED | OUTPATIENT
Start: 2021-11-10 | End: 2022-05-11 | Stop reason: SDUPTHER

## 2021-11-18 ENCOUNTER — IMMUNIZATION (OUTPATIENT)
Dept: FAMILY MEDICINE | Facility: CLINIC | Age: 66
End: 2021-11-18
Payer: COMMERCIAL

## 2021-11-18 DIAGNOSIS — Z23 NEED FOR VACCINATION: Primary | ICD-10-CM

## 2021-11-18 PROCEDURE — 0004A COVID-19, MRNA, LNP-S, PF, 30 MCG/0.3 ML DOSE VACCINE: CPT | Mod: PBBFAC | Performed by: FAMILY MEDICINE

## 2021-11-26 ENCOUNTER — LAB VISIT (OUTPATIENT)
Dept: LAB | Facility: HOSPITAL | Age: 66
End: 2021-11-26
Attending: INTERNAL MEDICINE
Payer: COMMERCIAL

## 2021-11-26 DIAGNOSIS — I25.110 CORONARY ARTERY DISEASE INVOLVING NATIVE CORONARY ARTERY OF NATIVE HEART WITH UNSTABLE ANGINA PECTORIS: ICD-10-CM

## 2021-11-26 LAB
ALBUMIN SERPL BCP-MCNC: 3.5 G/DL (ref 3.5–5.2)
ALP SERPL-CCNC: 112 U/L (ref 55–135)
ALT SERPL W/O P-5'-P-CCNC: 90 U/L (ref 10–44)
ANION GAP SERPL CALC-SCNC: 11 MMOL/L (ref 8–16)
AST SERPL-CCNC: 63 U/L (ref 10–40)
BILIRUB SERPL-MCNC: 0.6 MG/DL (ref 0.1–1)
BUN SERPL-MCNC: 13 MG/DL (ref 8–23)
CALCIUM SERPL-MCNC: 8.7 MG/DL (ref 8.7–10.5)
CHLORIDE SERPL-SCNC: 103 MMOL/L (ref 95–110)
CHOLEST SERPL-MCNC: 114 MG/DL (ref 120–199)
CHOLEST/HDLC SERPL: 4.1 {RATIO} (ref 2–5)
CO2 SERPL-SCNC: 26 MMOL/L (ref 23–29)
CREAT SERPL-MCNC: 0.9 MG/DL (ref 0.5–1.4)
EST. GFR  (AFRICAN AMERICAN): >60 ML/MIN/1.73 M^2
EST. GFR  (NON AFRICAN AMERICAN): >60 ML/MIN/1.73 M^2
GLUCOSE SERPL-MCNC: 109 MG/DL (ref 70–110)
HDLC SERPL-MCNC: 28 MG/DL (ref 40–75)
HDLC SERPL: 24.6 % (ref 20–50)
LDLC SERPL CALC-MCNC: 71.8 MG/DL (ref 63–159)
NONHDLC SERPL-MCNC: 86 MG/DL
POTASSIUM SERPL-SCNC: 4.2 MMOL/L (ref 3.5–5.1)
PROT SERPL-MCNC: 6.9 G/DL (ref 6–8.4)
SODIUM SERPL-SCNC: 140 MMOL/L (ref 136–145)
TRIGL SERPL-MCNC: 71 MG/DL (ref 30–150)

## 2021-11-26 PROCEDURE — 80053 COMPREHEN METABOLIC PANEL: CPT | Performed by: INTERNAL MEDICINE

## 2021-11-26 PROCEDURE — 36415 COLL VENOUS BLD VENIPUNCTURE: CPT | Mod: PO | Performed by: INTERNAL MEDICINE

## 2021-11-26 PROCEDURE — 80061 LIPID PANEL: CPT | Performed by: INTERNAL MEDICINE

## 2022-01-19 NOTE — TELEPHONE ENCOUNTER
No new care gaps identified.  Powered by [employer]. Reference number: [id number].   1/19/2022 11:10:13 AM CST

## 2022-01-24 RX ORDER — SILDENAFIL CITRATE 20 MG/1
TABLET ORAL
Qty: 30 TABLET | Refills: 11 | Status: SHIPPED | OUTPATIENT
Start: 2022-01-24 | End: 2023-04-28

## 2022-01-24 NOTE — TELEPHONE ENCOUNTER
Refill Routing Note   Medication(s) are not appropriate for processing by [facility] for the following reason(s):      - Drug-Drug Interaction (sildenafil and nitroGLYCERIN)  - Drug-Disease Interaction (Hepatic cyst; Hepatic lesion)    [facility] action(s):  Defer Medication-related problems identified:   Drug-disease interaction  Drug-drug interaction        --->Care Gap information included in message below if applicable.   Medication reconciliation completed: No   Automatic Epic Generated Protocol Data:        Requested Prescriptions   Pending Prescriptions Disp Refills    sildenafil (REVATIO) 20 mg Tab [Pharmacy Med Name: SILDENAFIL 20 MG TABLET] 30 tablet 11     Sig: TAKE 1 TABLET BY MOUTH THREE TIMES A DAY AS NEEDED       Urology: Erectile Dysfunction Agents Passed - 1/23/2022  6:39 PM        Passed - Patient is at least 18 years old        Passed - Nitrates are not on the active medication list        Passed - BP > 90/50 mmH     BP Readings from Last 1 Encounters:   11/10/21 120/70               Passed - Valid encounter within last 15 months     Recent Visits  Date Type Provider Dept   11/10/21 Office Visit Red Camejo MD [facility]   05/10/21 Office Visit Red Camejo MD [facility]   11/09/20 Office Visit Red Camejo MD [facility]   05/06/20 Office Visit Red Camejo MD [facility]   Showing recent visits within past 720 days and meeting all other requirements  Future Appointments  No visits were found meeting these conditions.  Showing future appointments within next 150 days and meeting all other requirements      Future Appointments              In 3 months Red Camejo MD [place] [facility]                      Appointments  past 12m or future 3m with PCP    Date Provider   Last Visit   11/10/2021 Red Camejo MD   Next Visit   5/11/2022 Red Camejo MD   ED visits in past 90 days: 0         Note composed:6:40 PM 01/23/2022

## 2022-01-25 ENCOUNTER — DOCUMENTATION ONLY (OUTPATIENT)
Dept: FAMILY MEDICINE | Facility: CLINIC | Age: 67
End: 2022-01-25
Payer: COMMERCIAL

## 2022-01-25 NOTE — PROGRESS NOTES
PA initiated in CM for Sildenafil 20 mg tablet  Case ID: [id number]  Walter: [id number]  CONCEPCIÓN

## 2022-02-09 ENCOUNTER — PATIENT MESSAGE (OUTPATIENT)
Dept: FAMILY MEDICINE | Facility: CLINIC | Age: 67
End: 2022-02-09
Payer: MEDICARE

## 2022-02-09 DIAGNOSIS — I25.110 CORONARY ARTERY DISEASE INVOLVING NATIVE CORONARY ARTERY OF NATIVE HEART WITH UNSTABLE ANGINA PECTORIS: Primary | ICD-10-CM

## 2022-02-17 ENCOUNTER — LAB VISIT (OUTPATIENT)
Dept: LAB | Facility: HOSPITAL | Age: 67
End: 2022-02-17
Attending: FAMILY MEDICINE
Payer: MEDICARE

## 2022-02-17 DIAGNOSIS — I25.110 CORONARY ARTERY DISEASE INVOLVING NATIVE CORONARY ARTERY OF NATIVE HEART WITH UNSTABLE ANGINA PECTORIS: ICD-10-CM

## 2022-02-17 LAB
ALBUMIN SERPL BCP-MCNC: 3.3 G/DL (ref 3.5–5.2)
ALP SERPL-CCNC: 124 U/L (ref 55–135)
ALT SERPL W/O P-5'-P-CCNC: 17 U/L (ref 10–44)
ANION GAP SERPL CALC-SCNC: 11 MMOL/L (ref 8–16)
AST SERPL-CCNC: 21 U/L (ref 10–40)
BASOPHILS # BLD AUTO: 0.04 K/UL (ref 0–0.2)
BASOPHILS NFR BLD: 0.3 % (ref 0–1.9)
BILIRUB SERPL-MCNC: 0.5 MG/DL (ref 0.1–1)
BUN SERPL-MCNC: 8 MG/DL (ref 8–23)
CALCIUM SERPL-MCNC: 9.1 MG/DL (ref 8.7–10.5)
CHLORIDE SERPL-SCNC: 104 MMOL/L (ref 95–110)
CHOLEST SERPL-MCNC: 122 MG/DL (ref 120–199)
CHOLEST/HDLC SERPL: 3.6 {RATIO} (ref 2–5)
CO2 SERPL-SCNC: 27 MMOL/L (ref 23–29)
CREAT SERPL-MCNC: 1 MG/DL (ref 0.5–1.4)
DIFFERENTIAL METHOD: ABNORMAL
EOSINOPHIL # BLD AUTO: 0.3 K/UL (ref 0–0.5)
EOSINOPHIL NFR BLD: 2.7 % (ref 0–8)
ERYTHROCYTE [DISTWIDTH] IN BLOOD BY AUTOMATED COUNT: 14.6 % (ref 11.5–14.5)
EST. GFR  (AFRICAN AMERICAN): >60 ML/MIN/1.73 M^2
EST. GFR  (NON AFRICAN AMERICAN): >60 ML/MIN/1.73 M^2
GLUCOSE SERPL-MCNC: 111 MG/DL (ref 70–110)
HCT VFR BLD AUTO: 48.1 % (ref 40–54)
HDLC SERPL-MCNC: 34 MG/DL (ref 40–75)
HDLC SERPL: 27.9 % (ref 20–50)
HGB BLD-MCNC: 15.6 G/DL (ref 14–18)
IMM GRANULOCYTES # BLD AUTO: 0.03 K/UL (ref 0–0.04)
IMM GRANULOCYTES NFR BLD AUTO: 0.2 % (ref 0–0.5)
LDLC SERPL CALC-MCNC: 73.8 MG/DL (ref 63–159)
LYMPHOCYTES # BLD AUTO: 4.1 K/UL (ref 1–4.8)
LYMPHOCYTES NFR BLD: 32.8 % (ref 18–48)
MCH RBC QN AUTO: 30.4 PG (ref 27–31)
MCHC RBC AUTO-ENTMCNC: 32.4 G/DL (ref 32–36)
MCV RBC AUTO: 94 FL (ref 82–98)
MONOCYTES # BLD AUTO: 0.9 K/UL (ref 0.3–1)
MONOCYTES NFR BLD: 7.6 % (ref 4–15)
NEUTROPHILS # BLD AUTO: 7 K/UL (ref 1.8–7.7)
NEUTROPHILS NFR BLD: 56.4 % (ref 38–73)
NONHDLC SERPL-MCNC: 88 MG/DL
NRBC BLD-RTO: 0 /100 WBC
PLATELET # BLD AUTO: 303 K/UL (ref 150–450)
PMV BLD AUTO: 10.5 FL (ref 9.2–12.9)
POTASSIUM SERPL-SCNC: 4.4 MMOL/L (ref 3.5–5.1)
PROT SERPL-MCNC: 6.8 G/DL (ref 6–8.4)
RBC # BLD AUTO: 5.13 M/UL (ref 4.6–6.2)
SODIUM SERPL-SCNC: 142 MMOL/L (ref 136–145)
TRIGL SERPL-MCNC: 71 MG/DL (ref 30–150)
TSH SERPL DL<=0.005 MIU/L-ACNC: 2.94 UIU/ML (ref 0.4–4)
WBC # BLD AUTO: 12.4 K/UL (ref 3.9–12.7)

## 2022-02-17 PROCEDURE — 85025 COMPLETE CBC W/AUTO DIFF WBC: CPT | Performed by: FAMILY MEDICINE

## 2022-02-17 PROCEDURE — 80061 LIPID PANEL: CPT | Performed by: FAMILY MEDICINE

## 2022-02-17 PROCEDURE — 36415 COLL VENOUS BLD VENIPUNCTURE: CPT | Mod: PO | Performed by: FAMILY MEDICINE

## 2022-02-17 PROCEDURE — 84443 ASSAY THYROID STIM HORMONE: CPT | Performed by: FAMILY MEDICINE

## 2022-02-17 PROCEDURE — 80053 COMPREHEN METABOLIC PANEL: CPT | Performed by: FAMILY MEDICINE

## 2022-05-09 ENCOUNTER — PATIENT MESSAGE (OUTPATIENT)
Dept: SMOKING CESSATION | Facility: CLINIC | Age: 67
End: 2022-05-09
Payer: MEDICARE

## 2022-05-11 ENCOUNTER — OFFICE VISIT (OUTPATIENT)
Dept: FAMILY MEDICINE | Facility: CLINIC | Age: 67
End: 2022-05-11
Payer: MEDICARE

## 2022-05-11 VITALS
RESPIRATION RATE: 18 BRPM | SYSTOLIC BLOOD PRESSURE: 110 MMHG | HEIGHT: 67 IN | BODY MASS INDEX: 28.82 KG/M2 | HEART RATE: 90 BPM | OXYGEN SATURATION: 95 % | WEIGHT: 183.63 LBS | DIASTOLIC BLOOD PRESSURE: 70 MMHG | TEMPERATURE: 98 F

## 2022-05-11 DIAGNOSIS — F17.200 NEEDS SMOKING CESSATION EDUCATION: ICD-10-CM

## 2022-05-11 DIAGNOSIS — Z00.00 PREVENTATIVE HEALTH CARE: ICD-10-CM

## 2022-05-11 DIAGNOSIS — F41.1 GAD (GENERALIZED ANXIETY DISORDER): Primary | ICD-10-CM

## 2022-05-11 PROCEDURE — 99999 PR PBB SHADOW E&M-EST. PATIENT-LVL III: ICD-10-PCS | Mod: PBBFAC,,, | Performed by: FAMILY MEDICINE

## 2022-05-11 PROCEDURE — 3078F DIAST BP <80 MM HG: CPT | Mod: CPTII,S$GLB,, | Performed by: FAMILY MEDICINE

## 2022-05-11 PROCEDURE — 90670 PNEUMOCOCCAL CONJUGATE VACCINE 13-VALENT LESS THAN 5YO & GREATER THAN: ICD-10-PCS | Mod: S$GLB,,, | Performed by: FAMILY MEDICINE

## 2022-05-11 PROCEDURE — 3078F PR MOST RECENT DIASTOLIC BLOOD PRESSURE < 80 MM HG: ICD-10-PCS | Mod: CPTII,S$GLB,, | Performed by: FAMILY MEDICINE

## 2022-05-11 PROCEDURE — 1159F PR MEDICATION LIST DOCUMENTED IN MEDICAL RECORD: ICD-10-PCS | Mod: CPTII,S$GLB,, | Performed by: FAMILY MEDICINE

## 2022-05-11 PROCEDURE — 1159F MED LIST DOCD IN RCRD: CPT | Mod: CPTII,S$GLB,, | Performed by: FAMILY MEDICINE

## 2022-05-11 PROCEDURE — 3074F PR MOST RECENT SYSTOLIC BLOOD PRESSURE < 130 MM HG: ICD-10-PCS | Mod: CPTII,S$GLB,, | Performed by: FAMILY MEDICINE

## 2022-05-11 PROCEDURE — 99213 PR OFFICE/OUTPT VISIT, EST, LEVL III, 20-29 MIN: ICD-10-PCS | Mod: 25,S$GLB,, | Performed by: FAMILY MEDICINE

## 2022-05-11 PROCEDURE — G0009 PNEUMOCOCCAL CONJUGATE VACCINE 13-VALENT LESS THAN 5YO & GREATER THAN: ICD-10-PCS | Mod: S$GLB,,, | Performed by: FAMILY MEDICINE

## 2022-05-11 PROCEDURE — G0009 ADMIN PNEUMOCOCCAL VACCINE: HCPCS | Mod: S$GLB,,, | Performed by: FAMILY MEDICINE

## 2022-05-11 PROCEDURE — 90670 PCV13 VACCINE IM: CPT | Mod: S$GLB,,, | Performed by: FAMILY MEDICINE

## 2022-05-11 PROCEDURE — 3008F BODY MASS INDEX DOCD: CPT | Mod: CPTII,S$GLB,, | Performed by: FAMILY MEDICINE

## 2022-05-11 PROCEDURE — 99999 PR PBB SHADOW E&M-EST. PATIENT-LVL III: CPT | Mod: PBBFAC,,, | Performed by: FAMILY MEDICINE

## 2022-05-11 PROCEDURE — 3008F PR BODY MASS INDEX (BMI) DOCUMENTED: ICD-10-PCS | Mod: CPTII,S$GLB,, | Performed by: FAMILY MEDICINE

## 2022-05-11 PROCEDURE — 3074F SYST BP LT 130 MM HG: CPT | Mod: CPTII,S$GLB,, | Performed by: FAMILY MEDICINE

## 2022-05-11 PROCEDURE — 99213 OFFICE O/P EST LOW 20 MIN: CPT | Mod: 25,S$GLB,, | Performed by: FAMILY MEDICINE

## 2022-05-11 RX ORDER — ALPRAZOLAM 1 MG/1
1 TABLET ORAL 2 TIMES DAILY
Qty: 60 TABLET | Refills: 5 | Status: SHIPPED | OUTPATIENT
Start: 2022-05-11 | End: 2022-12-14

## 2022-05-11 NOTE — PROGRESS NOTES
This is a 67-year-old white male who presents for 6 month f/u on chronic health issues    Overall doing well  CAD s/p stents, previous MI - tolerating Lipitor 80mg, toprol, lisinopril 2.5mg, Brilinta BID, ASA 81mg; following with cardiology, Dr. Olson  Anxiety -  Tolerating Effexor XR 75mg daily.  Mood is controlled. He is taking Xanax 1mg 1.5 - 2 tabs daily.   Barretts - doing well on the Nexium 40 mg daily  Wearing hearing aids      Past Medical History:   Diagnosis Date    Anxiety     Diaz esophagus     BPH (benign prostatic hyperplasia)     CAD (coronary artery disease)     Essential tremor     HEARING LOSS     hearing aids    Hepatic cyst     History of MI (myocardial infarction)     HLD (hyperlipidemia)     Shingles     right upper back     Past Surgical History:   Procedure Laterality Date    CORONARY STENT PLACEMENT      RCA, LAD    LEFT HEART CATHETERIZATION Left 6/15/2018    Procedure: Left heart cath;  Surgeon: Gino Olson MD;  Location: [facility] CATH;  Service: Cardiology;  Laterality: Left;    NASAL SEPTUM SURGERY      TONSILLECTOMY, ADENOIDECTOMY, BILATERAL MYRINGOTOMY AND TUBES       Current Outpatient Medications on File Prior to Visit   Medication Sig Dispense Refill    aspirin (ECOTRIN) 81 MG EC tablet Take 1 tablet (81 mg total) by mouth once daily.  0    atorvastatin (LIPITOR) 80 MG tablet Take 1 tablet (80 mg total) by mouth every evening. 90 tablet 3    esomeprazole (NEXIUM) 40 MG capsule TAKE 1 CAPSULE BY MOUTH EVERY DAY 90 capsule 3    lisinopril (PRINIVIL,ZESTRIL) 2.5 MG tablet Take 1 tablet (2.5 mg total) by mouth once daily. 90 tablet 3    metoprolol succinate (TOPROL-XL) 25 MG 24 hr tablet Take 25 mg by mouth once daily.  10    sildenafil (REVATIO) 20 mg Tab TAKE 1 TABLET BY MOUTH THREE TIMES A DAY AS NEEDED 30 tablet 11    ticagrelor (BRILINTA) 90 mg tablet Take 1 tablet (90 mg total) by mouth 2 (two) times daily. 60 tablet 11    venlafaxine (EFFEXOR-XR) 75  MG 24 hr capsule TAKE 1 CAPSULE BY MOUTH EVERY DAY 90 capsule 0    [DISCONTINUED] ALPRAZolam (XANAX) 1 MG tablet Take 1 tablet (1 mg total) by mouth 2 (two) times daily. 60 tablet 5    nitroGLYCERIN (NITROSTAT) 0.4 MG SL tablet Place 1 tablet (0.4 mg total) under the tongue every 5 (five) minutes as needed for Chest pain. (Patient not taking: No sig reported) 100 tablet 0     No current facility-administered medications on file prior to visit.         FAMILY HISTORY:  Father: CABG at 67, prostate cancer  Mother:alzheimer's  3 sisters  1 brother    SOCIAL HISTORY:  Tobacco use: variable (1/2 PPD)  Alcohol use:weekly wine  Ilicit drug use:denies  Occupation:residential development,   Marital status:  Children:5    REVIEW OF SYSTEMS:  GENERAL: No fever, chills, fatigability or weight changes.  SKIN/BREASTS: No rashes, sores, itching or changes in color or texture of skin.  HEAD: No headaches or recent head trauma.   EYES: Visual acuity fine. Denies blurriness, tearing, itching, photophobia, diplopia, or visual changes.   EARS: Denies ear pain, discharge, tinnitus or vertigo.  NOSE: No loss of smell, epistaxis, postnasal drip, discharge, obstruction, or sneezing.  MOUTH & THROAT: No hoarseness, change in voice, swallowing difficulty. No excessive gum bleeding.   HEMATOLOGICAL/NODES: Denies swollen glands. No bleeding or bruising.  CHEST: No KOVACS, cyanosis, wheezing, cough or sputum production.  CARDIOVASCULAR: Denies dyspnea, orthopnea, or palpitations.  GI/ABDOMEN: Appetite fine. No weight loss. Denies nausea, vomiting, diarrhea, constipation, abdominal pain, hematemesis or blood in stool.  URINARY: No dysuria,hematuria, nocturia, incontinence, flank pain, urgency, or urinary difficulty.  PERIPHERAL VASCULAR: No claudication, cold intolerance or cyanosis.  MUSCULOSKELETAL: No joint stiffness, pain, swelling, or loss of strength. Denies back pain.  NEUROLOGIC: No history of seizures, paralysis,  "alteration of gait or coordination.      PHYSICAL EXAM:  /70   Pulse 90   Temp 97.9 °F (36.6 °C)   Resp 18   Ht 5' 7" (1.702 m)   Wt 83.3 kg (183 lb 10.3 oz)   SpO2 95%   BMI 28.76 kg/m²     APPEARANCE: Well nourished, well developed, neatly groomed, in no acute distress.   SKIN: Warm, moist and dry. No lesions or rashes.  NECK: Supple with full range of motion. No masses. No thyromegaly. No JVD or bruits.  CHEST: CTA bilaterally  CV: S1S2 rrr no m/r/g  EXTREMITIES: No edema or cyanosis. Pedal pulses 2+ bilaterally. No varicosities   Gait & Posture: Normal gait and fine motion.  PSYCH: Awake, alert, oriented to person, place, time, and situation. Pleasant and cooperative mood with intact judgment.    Results for orders placed or performed in visit on 02/17/22   CBC Auto Differential   Result Value Ref Range    WBC 12.40 3.90 - 12.70 K/uL    RBC 5.13 4.60 - 6.20 M/uL    Hemoglobin 15.6 14.0 - 18.0 g/dL    Hematocrit 48.1 40.0 - 54.0 %    MCV 94 82 - 98 fL    MCH 30.4 27.0 - 31.0 pg    MCHC 32.4 32.0 - 36.0 g/dL    RDW 14.6 (H) 11.5 - 14.5 %    Platelets 303 150 - 450 K/uL    MPV 10.5 9.2 - 12.9 fL    Immature Granulocytes 0.2 0.0 - 0.5 %    Gran # (ANC) 7.0 1.8 - 7.7 K/uL    Immature Grans (Abs) 0.03 0.00 - 0.04 K/uL    Lymph # 4.1 1.0 - 4.8 K/uL    Mono # 0.9 0.3 - 1.0 K/uL    Eos # 0.3 0.0 - 0.5 K/uL    Baso # 0.04 0.00 - 0.20 K/uL    nRBC 0 0 /100 WBC    Gran % 56.4 38.0 - 73.0 %    Lymph % 32.8 18.0 - 48.0 %    Mono % 7.6 4.0 - 15.0 %    Eosinophil % 2.7 0.0 - 8.0 %    Basophil % 0.3 0.0 - 1.9 %    Differential Method Automated    Comprehensive Metabolic Panel   Result Value Ref Range    Sodium 142 136 - 145 mmol/L    Potassium 4.4 3.5 - 5.1 mmol/L    Chloride 104 95 - 110 mmol/L    CO2 27 23 - 29 mmol/L    Glucose 111 (H) 70 - 110 mg/dL    BUN 8 8 - 23 mg/dL    Creatinine 1.0 0.5 - 1.4 mg/dL    Calcium 9.1 8.7 - 10.5 mg/dL    Total Protein 6.8 6.0 - 8.4 g/dL    Albumin 3.3 (L) 3.5 - 5.2 g/dL    " Total Bilirubin 0.5 0.1 - 1.0 mg/dL    Alkaline Phosphatase 124 55 - 135 U/L    AST 21 10 - 40 U/L    ALT 17 10 - 44 U/L    Anion Gap 11 8 - 16 mmol/L    eGFR if African American >60.0 >60 mL/min/1.73 m^2    eGFR if non African American >60.0 >60 mL/min/1.73 m^2   Lipid Panel   Result Value Ref Range    Cholesterol 122 120 - 199 mg/dL    Triglycerides 71 30 - 150 mg/dL    HDL 34 (L) 40 - 75 mg/dL    LDL Cholesterol 73.8 63.0 - 159.0 mg/dL    HDL/Cholesterol Ratio 27.9 20.0 - 50.0 %    Total Cholesterol/HDL Ratio 3.6 2.0 - 5.0    Non-HDL Cholesterol 88 mg/dL   TSH   Result Value Ref Range    TSH 2.936 0.400 - 4.000 uIU/mL      reviewed    ASSESSMENT/PLAN:    SHAYAN (generalized anxiety disorder)  -     ALPRAZolam (XANAX) 1 MG tablet; Take 1 tablet (1 mg total) by mouth 2 (two) times daily.  Dispense: 60 tablet; Refill: 5    Preventative health care  -     (In Office Administered) Pneumococcal Conjugate Vaccine (13 Valent) (IM)       doing well  Counseled on regular exercise, maintenance of a healthy weight, balanced diet rich in fruits/vegetables and lean protein, and avoidance of unhealthy habits like smoking and excessive alcohol intake.  Xanax 1mg prn anxiety  Effexor 75mg daily  continue Nexium  Continue other present meds and annual cardiology follow-up  Repeat lung CT  F/u 6 months

## 2022-05-12 ENCOUNTER — TELEPHONE (OUTPATIENT)
Dept: FAMILY MEDICINE | Facility: CLINIC | Age: 67
End: 2022-05-12
Payer: MEDICARE

## 2022-05-12 DIAGNOSIS — R91.1 SOLITARY PULMONARY NODULE: ICD-10-CM

## 2022-05-12 RX ORDER — ATORVASTATIN CALCIUM 80 MG/1
TABLET, FILM COATED ORAL
Qty: 90 TABLET | Refills: 3 | Status: SHIPPED | OUTPATIENT
Start: 2022-05-12 | End: 2023-03-16

## 2022-05-12 NOTE — TELEPHONE ENCOUNTER
Sent message to Ct scan , to try to get pt added to schedule, waiting to hear from them    Please check if pt scheduled for appt late Friday/monday

## 2022-05-12 NOTE — TELEPHONE ENCOUNTER
No new care gaps identified.  [facility] Embedded Care Gaps. Reference number: [id number]. 5/12/2022   8:31:15 AM CDT

## 2022-05-12 NOTE — TELEPHONE ENCOUNTER
Refill Authorization Note   Jose Blanco  is requesting a refill authorization.  Brief Assessment and Rationale for Refill:  Approve     Medication Therapy Plan:       Medication Reconciliation Completed: No   Comments:     No Care Gaps recommended.     Note composed:11:41 AM 05/12/2022

## 2022-05-25 ENCOUNTER — TELEPHONE (OUTPATIENT)
Dept: FAMILY MEDICINE | Facility: CLINIC | Age: 67
End: 2022-05-25
Payer: MEDICARE

## 2022-05-25 NOTE — TELEPHONE ENCOUNTER
Spoke with pt to schedule CT scan , pt states that he is going out of town and cannot schedule right now , states he will call when he gets back to schedule

## 2022-05-27 ENCOUNTER — PATIENT MESSAGE (OUTPATIENT)
Dept: FAMILY MEDICINE | Facility: CLINIC | Age: 67
End: 2022-05-27
Payer: MEDICARE

## 2022-05-30 ENCOUNTER — HOSPITAL ENCOUNTER (OUTPATIENT)
Dept: RADIOLOGY | Facility: HOSPITAL | Age: 67
Discharge: HOME | End: 2022-05-30
Attending: FAMILY MEDICINE
Payer: MEDICARE

## 2022-05-30 DIAGNOSIS — R91.1 SOLITARY PULMONARY NODULE: ICD-10-CM

## 2022-05-30 PROCEDURE — 71250 CT CHEST WITHOUT CONTRAST: ICD-10-PCS | Mod: 26,,, | Performed by: RADIOLOGY

## 2022-05-30 PROCEDURE — 71250 CT THORAX DX C-: CPT | Mod: TC,PO

## 2022-05-30 PROCEDURE — 71250 CT THORAX DX C-: CPT | Mod: 26,,, | Performed by: RADIOLOGY

## 2022-07-02 DIAGNOSIS — F41.1 GAD (GENERALIZED ANXIETY DISORDER): ICD-10-CM

## 2022-07-02 NOTE — TELEPHONE ENCOUNTER
No new care gaps identified.  [facility] Embedded Care Gaps. Reference number: [id number]. 7/02/2022   6:52:09 PM CDT

## 2022-07-04 RX ORDER — VENLAFAXINE HYDROCHLORIDE 75 MG/1
CAPSULE, EXTENDED RELEASE ORAL
Qty: 90 CAPSULE | Refills: 2 | Status: SHIPPED | OUTPATIENT
Start: 2022-07-04 | End: 2023-03-16

## 2022-07-04 NOTE — TELEPHONE ENCOUNTER
Refill Decision Note   Jose Bella  is requesting a refill authorization.  Brief Assessment and Rationale for Refill:  Approve     Medication Therapy Plan:       Medication Reconciliation Completed: No   Comments:     No Care Gaps recommended.     Note composed:12:17 PM 07/04/2022

## 2022-08-01 ENCOUNTER — TELEPHONE (OUTPATIENT)
Dept: FAMILY MEDICINE | Facility: CLINIC | Age: 67
End: 2022-08-01
Payer: MEDICARE

## 2022-08-01 NOTE — TELEPHONE ENCOUNTER
----- Message from Lucy Diego, Patient Care Assistant sent at 8/1/2022  9:39 AM CDT -----  Regarding: advice  Contact: pt  Type: Needs Medical Advice  Who Called:  pt   Best Call Back Number: [phone] (home) [phone]     Additional Information: pt states he would like a callback regarding faxing his CT scan notes to Dr. Red Rollins in [place]. Please call pt to advise. Thanks!

## 2022-11-11 ENCOUNTER — OFFICE VISIT (OUTPATIENT)
Dept: FAMILY MEDICINE | Facility: CLINIC | Age: 67
End: 2022-11-11
Payer: MEDICARE

## 2022-11-11 VITALS
DIASTOLIC BLOOD PRESSURE: 70 MMHG | OXYGEN SATURATION: 98 % | WEIGHT: 185.63 LBS | SYSTOLIC BLOOD PRESSURE: 118 MMHG | RESPIRATION RATE: 18 BRPM | HEIGHT: 67 IN | HEART RATE: 85 BPM | BODY MASS INDEX: 29.13 KG/M2

## 2022-11-11 DIAGNOSIS — E78.5 HYPERLIPIDEMIA, UNSPECIFIED HYPERLIPIDEMIA TYPE: ICD-10-CM

## 2022-11-11 DIAGNOSIS — Z72.0 TOBACCO ABUSE: ICD-10-CM

## 2022-11-11 DIAGNOSIS — Z87.891 PERSONAL HISTORY OF NICOTINE DEPENDENCE: ICD-10-CM

## 2022-11-11 DIAGNOSIS — Z12.5 PROSTATE CANCER SCREENING: ICD-10-CM

## 2022-11-11 DIAGNOSIS — F41.9 ANXIETY: ICD-10-CM

## 2022-11-11 DIAGNOSIS — I10 HYPERTENSION, UNSPECIFIED TYPE: Primary | ICD-10-CM

## 2022-11-11 PROCEDURE — 1159F MED LIST DOCD IN RCRD: CPT | Mod: CPTII,S$GLB,, | Performed by: FAMILY MEDICINE

## 2022-11-11 PROCEDURE — 4010F ACE/ARB THERAPY RXD/TAKEN: CPT | Mod: CPTII,S$GLB,, | Performed by: FAMILY MEDICINE

## 2022-11-11 PROCEDURE — 3078F DIAST BP <80 MM HG: CPT | Mod: CPTII,S$GLB,, | Performed by: FAMILY MEDICINE

## 2022-11-11 PROCEDURE — 90694 VACC AIIV4 NO PRSRV 0.5ML IM: CPT | Mod: S$GLB,,, | Performed by: FAMILY MEDICINE

## 2022-11-11 PROCEDURE — 3288F PR FALLS RISK ASSESSMENT DOCUMENTED: ICD-10-PCS | Mod: CPTII,S$GLB,, | Performed by: FAMILY MEDICINE

## 2022-11-11 PROCEDURE — G0008 FLU VACCINE - QUADRIVALENT - ADJUVANTED: ICD-10-PCS | Mod: S$GLB,,, | Performed by: FAMILY MEDICINE

## 2022-11-11 PROCEDURE — 4010F PR ACE/ARB THEARPY RXD/TAKEN: ICD-10-PCS | Mod: CPTII,S$GLB,, | Performed by: FAMILY MEDICINE

## 2022-11-11 PROCEDURE — 3078F PR MOST RECENT DIASTOLIC BLOOD PRESSURE < 80 MM HG: ICD-10-PCS | Mod: CPTII,S$GLB,, | Performed by: FAMILY MEDICINE

## 2022-11-11 PROCEDURE — 3008F PR BODY MASS INDEX (BMI) DOCUMENTED: ICD-10-PCS | Mod: CPTII,S$GLB,, | Performed by: FAMILY MEDICINE

## 2022-11-11 PROCEDURE — 1101F PR PT FALLS ASSESS DOC 0-1 FALLS W/OUT INJ PAST YR: ICD-10-PCS | Mod: CPTII,S$GLB,, | Performed by: FAMILY MEDICINE

## 2022-11-11 PROCEDURE — 3288F FALL RISK ASSESSMENT DOCD: CPT | Mod: CPTII,S$GLB,, | Performed by: FAMILY MEDICINE

## 2022-11-11 PROCEDURE — 1159F PR MEDICATION LIST DOCUMENTED IN MEDICAL RECORD: ICD-10-PCS | Mod: CPTII,S$GLB,, | Performed by: FAMILY MEDICINE

## 2022-11-11 PROCEDURE — 99214 OFFICE O/P EST MOD 30 MIN: CPT | Mod: S$GLB,,, | Performed by: FAMILY MEDICINE

## 2022-11-11 PROCEDURE — 1160F RVW MEDS BY RX/DR IN RCRD: CPT | Mod: CPTII,S$GLB,, | Performed by: FAMILY MEDICINE

## 2022-11-11 PROCEDURE — G0008 ADMIN INFLUENZA VIRUS VAC: HCPCS | Mod: S$GLB,,, | Performed by: FAMILY MEDICINE

## 2022-11-11 PROCEDURE — 1160F PR REVIEW ALL MEDS BY PRESCRIBER/CLIN PHARMACIST DOCUMENTED: ICD-10-PCS | Mod: CPTII,S$GLB,, | Performed by: FAMILY MEDICINE

## 2022-11-11 PROCEDURE — 99999 PR PBB SHADOW E&M-EST. PATIENT-LVL III: CPT | Mod: PBBFAC,,, | Performed by: FAMILY MEDICINE

## 2022-11-11 PROCEDURE — 90694 FLU VACCINE - QUADRIVALENT - ADJUVANTED: ICD-10-PCS | Mod: S$GLB,,, | Performed by: FAMILY MEDICINE

## 2022-11-11 PROCEDURE — 1101F PT FALLS ASSESS-DOCD LE1/YR: CPT | Mod: CPTII,S$GLB,, | Performed by: FAMILY MEDICINE

## 2022-11-11 PROCEDURE — 3074F SYST BP LT 130 MM HG: CPT | Mod: CPTII,S$GLB,, | Performed by: FAMILY MEDICINE

## 2022-11-11 PROCEDURE — 3008F BODY MASS INDEX DOCD: CPT | Mod: CPTII,S$GLB,, | Performed by: FAMILY MEDICINE

## 2022-11-11 PROCEDURE — 3074F PR MOST RECENT SYSTOLIC BLOOD PRESSURE < 130 MM HG: ICD-10-PCS | Mod: CPTII,S$GLB,, | Performed by: FAMILY MEDICINE

## 2022-11-11 PROCEDURE — 99214 PR OFFICE/OUTPT VISIT, EST, LEVL IV, 30-39 MIN: ICD-10-PCS | Mod: S$GLB,,, | Performed by: FAMILY MEDICINE

## 2022-11-11 PROCEDURE — 99999 PR PBB SHADOW E&M-EST. PATIENT-LVL III: ICD-10-PCS | Mod: PBBFAC,,, | Performed by: FAMILY MEDICINE

## 2022-11-11 NOTE — PROGRESS NOTES
Chief Complaint   Patient presents with    Anxiety     6 month follow up         This is a 67-year-old white male who presents for 6 month f/u on chronic health issues    Overall doing well  CAD s/p stents, previous MI - tolerating Lipitor 80mg, toprol, lisinopril 2.5mg, Brilinta BID, ASA 81mg; following with cardiology, Dr. Olson  Anxiety -  Tolerating Effexor XR 75mg daily.  Mood is controlled. He is taking Xanax 1mg 1.5 - 2 tabs daily.   Barretts - doing well on the Nexium 40 mg daily  Wearing hearing aids      Past Medical History:   Diagnosis Date    Anxiety     Diaz esophagus     BPH (benign prostatic hyperplasia)     CAD (coronary artery disease)     Essential tremor     HEARING LOSS     hearing aids    Hepatic cyst     History of MI (myocardial infarction)     HLD (hyperlipidemia)     Shingles     right upper back     Past Surgical History:   Procedure Laterality Date    CORONARY STENT PLACEMENT      RCA, LAD    LEFT HEART CATHETERIZATION Left 6/15/2018    Procedure: Left heart cath;  Surgeon: Gino Olson MD;  Location: [facility] CATH;  Service: Cardiology;  Laterality: Left;    NASAL SEPTUM SURGERY      TONSILLECTOMY, ADENOIDECTOMY, BILATERAL MYRINGOTOMY AND TUBES       Current Outpatient Medications on File Prior to Visit   Medication Sig Dispense Refill    ALPRAZolam (XANAX) 1 MG tablet Take 1 tablet (1 mg total) by mouth 2 (two) times daily. 60 tablet 5    aspirin (ECOTRIN) 81 MG EC tablet Take 1 tablet (81 mg total) by mouth once daily.  0    atorvastatin (LIPITOR) 80 MG tablet TAKE 1 TABLET BY MOUTH EVERY EVENING 90 tablet 3    esomeprazole (NEXIUM) 40 MG capsule TAKE 1 CAPSULE BY MOUTH EVERY DAY 90 capsule 3    lisinopril (PRINIVIL,ZESTRIL) 2.5 MG tablet Take 1 tablet (2.5 mg total) by mouth once daily. 90 tablet 3    metoprolol succinate (TOPROL-XL) 25 MG 24 hr tablet Take 25 mg by mouth once daily.  10    sildenafil (REVATIO) 20 mg Tab TAKE 1 TABLET BY MOUTH THREE TIMES A DAY AS NEEDED 30 tablet  11    ticagrelor (BRILINTA) 90 mg tablet Take 1 tablet (90 mg total) by mouth 2 (two) times daily. 60 tablet 11    venlafaxine (EFFEXOR-XR) 75 MG 24 hr capsule TAKE 1 CAPSULE BY MOUTH EVERY DAY 90 capsule 2    nitroGLYCERIN (NITROSTAT) 0.4 MG SL tablet Place 1 tablet (0.4 mg total) under the tongue every 5 (five) minutes as needed for Chest pain. (Patient not taking: Reported on 11/10/2021) 100 tablet 0     No current facility-administered medications on file prior to visit.         FAMILY HISTORY:  Father: CABG at 67, prostate cancer  Mother:alzheimer's  3 sisters  1 brother    SOCIAL HISTORY:  Tobacco use: variable (1/2 PPD)  Alcohol use:weekly wine  Ilicit drug use:denies  Occupation:residential development,   Marital status:  Children:5    REVIEW OF SYSTEMS:  GENERAL: No fever, chills, fatigability or weight changes.  SKIN/BREASTS: No rashes, sores, itching or changes in color or texture of skin.  HEAD: No headaches or recent head trauma.   EYES: Visual acuity fine. Denies blurriness, tearing, itching, photophobia, diplopia, or visual changes.   EARS: Denies ear pain, discharge, tinnitus or vertigo.  NOSE: No loss of smell, epistaxis, postnasal drip, discharge, obstruction, or sneezing.  MOUTH & THROAT: No hoarseness, change in voice, swallowing difficulty. No excessive gum bleeding.   HEMATOLOGICAL/NODES: Denies swollen glands. No bleeding or bruising.  CHEST: No KOVACS, cyanosis, wheezing, cough or sputum production.  CARDIOVASCULAR: Denies dyspnea, orthopnea, or palpitations.  GI/ABDOMEN: Appetite fine. No weight loss. Denies nausea, vomiting, diarrhea, constipation, abdominal pain, hematemesis or blood in stool.  URINARY: No dysuria,hematuria, nocturia, incontinence, flank pain, urgency, or urinary difficulty.  PERIPHERAL VASCULAR: No claudication, cold intolerance or cyanosis.  MUSCULOSKELETAL: No joint stiffness, pain, swelling, or loss of strength. Denies back pain.  NEUROLOGIC: No history of  "seizures, paralysis, alteration of gait or coordination.      PHYSICAL EXAM:  /70   Pulse 85   Resp 18   Ht 5' 7" (1.702 m)   Wt 84.2 kg (185 lb 10 oz)   SpO2 98%   BMI 29.07 kg/m²     APPEARANCE: Well nourished, well developed, neatly groomed, in no acute distress.   SKIN: Warm, moist and dry. No lesions or rashes.  NECK: Supple with full range of motion. No masses. No thyromegaly. No JVD or bruits.  CHEST: CTA bilaterally  CV: S1S2 rrr no m/r/g  EXTREMITIES: No edema or cyanosis. Pedal pulses 2+ bilaterally. No varicosities   Gait & Posture: Normal gait and fine motion.  PSYCH: Awake, alert, oriented to person, place, time, and situation. Pleasant and cooperative mood with intact judgment.    Results for orders placed or performed in visit on 02/17/22   CBC Auto Differential   Result Value Ref Range    WBC 12.40 3.90 - 12.70 K/uL    RBC 5.13 4.60 - 6.20 M/uL    Hemoglobin 15.6 14.0 - 18.0 g/dL    Hematocrit 48.1 40.0 - 54.0 %    MCV 94 82 - 98 fL    MCH 30.4 27.0 - 31.0 pg    MCHC 32.4 32.0 - 36.0 g/dL    RDW 14.6 (H) 11.5 - 14.5 %    Platelets 303 150 - 450 K/uL    MPV 10.5 9.2 - 12.9 fL    Immature Granulocytes 0.2 0.0 - 0.5 %    Gran # (ANC) 7.0 1.8 - 7.7 K/uL    Immature Grans (Abs) 0.03 0.00 - 0.04 K/uL    Lymph # 4.1 1.0 - 4.8 K/uL    Mono # 0.9 0.3 - 1.0 K/uL    Eos # 0.3 0.0 - 0.5 K/uL    Baso # 0.04 0.00 - 0.20 K/uL    nRBC 0 0 /100 WBC    Gran % 56.4 38.0 - 73.0 %    Lymph % 32.8 18.0 - 48.0 %    Mono % 7.6 4.0 - 15.0 %    Eosinophil % 2.7 0.0 - 8.0 %    Basophil % 0.3 0.0 - 1.9 %    Differential Method Automated    Comprehensive Metabolic Panel   Result Value Ref Range    Sodium 142 136 - 145 mmol/L    Potassium 4.4 3.5 - 5.1 mmol/L    Chloride 104 95 - 110 mmol/L    CO2 27 23 - 29 mmol/L    Glucose 111 (H) 70 - 110 mg/dL    BUN 8 8 - 23 mg/dL    Creatinine 1.0 0.5 - 1.4 mg/dL    Calcium 9.1 8.7 - 10.5 mg/dL    Total Protein 6.8 6.0 - 8.4 g/dL    Albumin 3.3 (L) 3.5 - 5.2 g/dL    Total " Bilirubin 0.5 0.1 - 1.0 mg/dL    Alkaline Phosphatase 124 55 - 135 U/L    AST 21 10 - 40 U/L    ALT 17 10 - 44 U/L    Anion Gap 11 8 - 16 mmol/L    eGFR if African American >60.0 >60 mL/min/1.73 m^2    eGFR if non African American >60.0 >60 mL/min/1.73 m^2   Lipid Panel   Result Value Ref Range    Cholesterol 122 120 - 199 mg/dL    Triglycerides 71 30 - 150 mg/dL    HDL 34 (L) 40 - 75 mg/dL    LDL Cholesterol 73.8 63.0 - 159.0 mg/dL    HDL/Cholesterol Ratio 27.9 20.0 - 50.0 %    Total Cholesterol/HDL Ratio 3.6 2.0 - 5.0    Non-HDL Cholesterol 88 mg/dL   TSH   Result Value Ref Range    TSH 2.936 0.400 - 4.000 uIU/mL      reviewed    ASSESSMENT/PLAN:    Hypertension, unspecified type  -     CBC Auto Differential; Future; Expected date: 05/10/2023  -     TSH; Future; Expected date: 11/11/2022    Tobacco abuse  -     CT Chest Lung Screening Low Dose; Future; Expected date: 11/11/2022    Personal history of nicotine dependence  -     CT Chest Lung Screening Low Dose; Future; Expected date: 11/11/2022    Prostate cancer screening  -     PSA, Screening; Future; Expected date: 05/10/2023    Hyperlipidemia, unspecified hyperlipidemia type  -     Comprehensive Metabolic Panel; Future; Expected date: 05/10/2023  -     Lipid Panel; Future; Expected date: 05/10/2023    Anxiety    Other orders  -     Influenza - Quadrivalent (Adjuvanted)       doing well  Counseled on regular exercise, maintenance of a healthy weight, balanced diet rich in fruits/vegetables and lean protein, and avoidance of unhealthy habits like smoking and excessive alcohol intake.  Xanax 1mg prn anxiety  Effexor 75mg daily  continue Nexium  Continue other present meds and annual cardiology follow-up    F/u 6 months with labs for physical

## 2022-11-15 ENCOUNTER — HOSPITAL ENCOUNTER (OUTPATIENT)
Dept: RADIOLOGY | Facility: HOSPITAL | Age: 67
Discharge: HOME | End: 2022-11-15
Attending: FAMILY MEDICINE
Payer: MEDICARE

## 2022-11-15 DIAGNOSIS — Z87.891 PERSONAL HISTORY OF NICOTINE DEPENDENCE: ICD-10-CM

## 2022-11-15 DIAGNOSIS — Z72.0 TOBACCO ABUSE: ICD-10-CM

## 2022-11-15 PROCEDURE — 71271 CT CHEST LUNG SCREENING LOW DOSE: ICD-10-PCS | Mod: 26,,, | Performed by: RADIOLOGY

## 2022-11-15 PROCEDURE — 71271 CT THORAX LUNG CANCER SCR C-: CPT | Mod: 26,,, | Performed by: RADIOLOGY

## 2022-11-15 PROCEDURE — 71271 CT THORAX LUNG CANCER SCR C-: CPT | Mod: TC,PO

## 2023-04-05 ENCOUNTER — TELEPHONE (OUTPATIENT)
Dept: FAMILY MEDICINE | Facility: CLINIC | Age: 68
End: 2023-04-05
Payer: MEDICARE

## 2023-04-05 NOTE — TELEPHONE ENCOUNTER
Left message on recorder for Mr. Beckwith to please call the clinic to reschedule his appointment with Dr. Camejo from 5/11/23.  Dr. Camejo will not be in clinic that afternoon.  I have canceled the appointment.

## 2023-04-28 RX ORDER — SILDENAFIL CITRATE 20 MG/1
TABLET ORAL
Qty: 30 TABLET | Refills: 5 | Status: ON HOLD | OUTPATIENT
Start: 2023-04-28 | End: 2024-01-28 | Stop reason: HOSPADM

## 2023-04-28 NOTE — TELEPHONE ENCOUNTER
Refill Routing Note   Medication(s) are not appropriate for processing by [facility] for the following reason(s):       Drug-drug interaction    [facility] action(s):  Defer      Medication Therapy Plan: Drug-Drug: sildenafil and nitroGLYCERIN    Appointments  past 12m or future 3m with PCP    Date Provider   Last Visit   11/11/2022 Red Camejo MD   Next Visit   Visit date not found Red Camejo MD   ED visits in past 90 days: 0        Note composed:11:18 AM 04/28/2023

## 2023-04-28 NOTE — TELEPHONE ENCOUNTER
Refill Decision Note   Jose Blanco  is requesting a refill authorization.  Brief Assessment and Rationale for Refill:  Approve     Medication Therapy Plan:         Comments:     No Care Gaps recommended.     Note composed:11:12 AM 04/28/2023

## 2023-04-28 NOTE — TELEPHONE ENCOUNTER
Refill Routing Note   Medication(s) are not appropriate for processing by [facility] for the following reason(s):      Drug-drug interaction    [facility] action(s):  Defer       Medication Therapy Plan: Drug-Drug: sildenafil and nitroGLYCERIN      Appointments  past 12m or future 3m with PCP    Date Provider   Last Visit   11/11/2022 Red Camejo MD   Next Visit   Visit date not found Red Camejo MD   ED visits in past 90 days: 0        Note composed:12:35 PM 04/28/2023

## 2023-04-28 NOTE — TELEPHONE ENCOUNTER
No care due was identified.  [facility] Embedded Care Due Messages. Reference number: [id number].   4/28/2023 8:41:10 AM CDT

## 2023-05-03 ENCOUNTER — TELEPHONE (OUTPATIENT)
Dept: FAMILY MEDICINE | Facility: CLINIC | Age: 68
End: 2023-05-03
Payer: MEDICARE

## 2023-05-04 ENCOUNTER — LAB VISIT (OUTPATIENT)
Dept: LAB | Facility: HOSPITAL | Age: 68
End: 2023-05-04
Attending: FAMILY MEDICINE
Payer: MEDICARE

## 2023-05-04 DIAGNOSIS — E78.5 HYPERLIPIDEMIA, UNSPECIFIED HYPERLIPIDEMIA TYPE: ICD-10-CM

## 2023-05-04 DIAGNOSIS — I10 HYPERTENSION, UNSPECIFIED TYPE: ICD-10-CM

## 2023-05-04 DIAGNOSIS — Z12.5 PROSTATE CANCER SCREENING: ICD-10-CM

## 2023-05-04 LAB
ALBUMIN SERPL BCP-MCNC: 3.6 G/DL (ref 3.5–5.2)
ALP SERPL-CCNC: 111 U/L (ref 55–135)
ALT SERPL W/O P-5'-P-CCNC: 18 U/L (ref 10–44)
ANION GAP SERPL CALC-SCNC: 9 MMOL/L (ref 8–16)
AST SERPL-CCNC: 20 U/L (ref 10–40)
BASOPHILS # BLD AUTO: 0.04 K/UL (ref 0–0.2)
BASOPHILS NFR BLD: 0.4 % (ref 0–1.9)
BILIRUB SERPL-MCNC: 0.7 MG/DL (ref 0.1–1)
BUN SERPL-MCNC: 12 MG/DL (ref 8–23)
CALCIUM SERPL-MCNC: 9.2 MG/DL (ref 8.7–10.5)
CHLORIDE SERPL-SCNC: 104 MMOL/L (ref 95–110)
CHOLEST SERPL-MCNC: 105 MG/DL (ref 120–199)
CHOLEST/HDLC SERPL: 3 {RATIO} (ref 2–5)
CO2 SERPL-SCNC: 25 MMOL/L (ref 23–29)
COMPLEXED PSA SERPL-MCNC: 4.8 NG/ML (ref 0–4)
CREAT SERPL-MCNC: 1 MG/DL (ref 0.5–1.4)
DIFFERENTIAL METHOD: NORMAL
EOSINOPHIL # BLD AUTO: 0.4 K/UL (ref 0–0.5)
EOSINOPHIL NFR BLD: 3.2 % (ref 0–8)
ERYTHROCYTE [DISTWIDTH] IN BLOOD BY AUTOMATED COUNT: 14.4 % (ref 11.5–14.5)
EST. GFR  (NO RACE VARIABLE): >60 ML/MIN/1.73 M^2
GLUCOSE SERPL-MCNC: 108 MG/DL (ref 70–110)
HCT VFR BLD AUTO: 47.5 % (ref 40–54)
HDLC SERPL-MCNC: 35 MG/DL (ref 40–75)
HDLC SERPL: 33.3 % (ref 20–50)
HGB BLD-MCNC: 15.8 G/DL (ref 14–18)
IMM GRANULOCYTES # BLD AUTO: 0.03 K/UL (ref 0–0.04)
IMM GRANULOCYTES NFR BLD AUTO: 0.3 % (ref 0–0.5)
LDLC SERPL CALC-MCNC: 56.4 MG/DL (ref 63–159)
LYMPHOCYTES # BLD AUTO: 4.1 K/UL (ref 1–4.8)
LYMPHOCYTES NFR BLD: 37.8 % (ref 18–48)
MCH RBC QN AUTO: 30 PG (ref 27–31)
MCHC RBC AUTO-ENTMCNC: 33.3 G/DL (ref 32–36)
MCV RBC AUTO: 90 FL (ref 82–98)
MONOCYTES # BLD AUTO: 1 K/UL (ref 0.3–1)
MONOCYTES NFR BLD: 9.2 % (ref 4–15)
NEUTROPHILS # BLD AUTO: 5.4 K/UL (ref 1.8–7.7)
NEUTROPHILS NFR BLD: 49.1 % (ref 38–73)
NONHDLC SERPL-MCNC: 70 MG/DL
NRBC BLD-RTO: 0 /100 WBC
PLATELET # BLD AUTO: 263 K/UL (ref 150–450)
PMV BLD AUTO: 11 FL (ref 9.2–12.9)
POTASSIUM SERPL-SCNC: 4.7 MMOL/L (ref 3.5–5.1)
PROT SERPL-MCNC: 7 G/DL (ref 6–8.4)
RBC # BLD AUTO: 5.26 M/UL (ref 4.6–6.2)
SODIUM SERPL-SCNC: 138 MMOL/L (ref 136–145)
TRIGL SERPL-MCNC: 68 MG/DL (ref 30–150)
TSH SERPL DL<=0.005 MIU/L-ACNC: 3.96 UIU/ML (ref 0.4–4)
WBC # BLD AUTO: 10.95 K/UL (ref 3.9–12.7)

## 2023-05-04 PROCEDURE — 80053 COMPREHEN METABOLIC PANEL: CPT | Performed by: FAMILY MEDICINE

## 2023-05-04 PROCEDURE — 36415 COLL VENOUS BLD VENIPUNCTURE: CPT | Mod: PO | Performed by: FAMILY MEDICINE

## 2023-05-04 PROCEDURE — 85025 COMPLETE CBC W/AUTO DIFF WBC: CPT | Performed by: FAMILY MEDICINE

## 2023-05-04 PROCEDURE — 84153 ASSAY OF PSA TOTAL: CPT | Performed by: FAMILY MEDICINE

## 2023-05-04 PROCEDURE — 80061 LIPID PANEL: CPT | Performed by: FAMILY MEDICINE

## 2023-05-04 PROCEDURE — 84443 ASSAY THYROID STIM HORMONE: CPT | Performed by: FAMILY MEDICINE

## 2023-05-17 ENCOUNTER — OFFICE VISIT (OUTPATIENT)
Dept: FAMILY MEDICINE | Facility: CLINIC | Age: 68
End: 2023-05-17
Payer: MEDICARE

## 2023-05-17 VITALS
TEMPERATURE: 97 F | RESPIRATION RATE: 18 BRPM | SYSTOLIC BLOOD PRESSURE: 100 MMHG | DIASTOLIC BLOOD PRESSURE: 60 MMHG | BODY MASS INDEX: 28.72 KG/M2 | HEART RATE: 79 BPM | HEIGHT: 67 IN | OXYGEN SATURATION: 97 % | WEIGHT: 183 LBS

## 2023-05-17 DIAGNOSIS — I10 HYPERTENSION, UNSPECIFIED TYPE: ICD-10-CM

## 2023-05-17 DIAGNOSIS — F41.1 GAD (GENERALIZED ANXIETY DISORDER): ICD-10-CM

## 2023-05-17 DIAGNOSIS — R97.20 ELEVATED PSA: ICD-10-CM

## 2023-05-17 DIAGNOSIS — F41.9 ANXIETY: ICD-10-CM

## 2023-05-17 DIAGNOSIS — Z00.00 PREVENTATIVE HEALTH CARE: Primary | ICD-10-CM

## 2023-05-17 DIAGNOSIS — K22.70 BARRETT'S ESOPHAGUS WITHOUT DYSPLASIA: ICD-10-CM

## 2023-05-17 PROCEDURE — 1126F AMNT PAIN NOTED NONE PRSNT: CPT | Mod: CPTII,,, | Performed by: FAMILY MEDICINE

## 2023-05-17 PROCEDURE — 3078F DIAST BP <80 MM HG: CPT | Mod: CPTII,,, | Performed by: FAMILY MEDICINE

## 2023-05-17 PROCEDURE — 1159F PR MEDICATION LIST DOCUMENTED IN MEDICAL RECORD: ICD-10-PCS | Mod: CPTII,,, | Performed by: FAMILY MEDICINE

## 2023-05-17 PROCEDURE — 1101F PT FALLS ASSESS-DOCD LE1/YR: CPT | Mod: CPTII,,, | Performed by: FAMILY MEDICINE

## 2023-05-17 PROCEDURE — 99999 PR PBB SHADOW E&M-EST. PATIENT-LVL III: ICD-10-PCS | Mod: PBBFAC,,, | Performed by: FAMILY MEDICINE

## 2023-05-17 PROCEDURE — 1159F MED LIST DOCD IN RCRD: CPT | Mod: CPTII,,, | Performed by: FAMILY MEDICINE

## 2023-05-17 PROCEDURE — 99999 PR PBB SHADOW E&M-EST. PATIENT-LVL III: CPT | Mod: PBBFAC,,, | Performed by: FAMILY MEDICINE

## 2023-05-17 PROCEDURE — 99397 PR PREVENTIVE VISIT,EST,65 & OVER: ICD-10-PCS | Mod: ,,, | Performed by: FAMILY MEDICINE

## 2023-05-17 PROCEDURE — 3078F PR MOST RECENT DIASTOLIC BLOOD PRESSURE < 80 MM HG: ICD-10-PCS | Mod: CPTII,,, | Performed by: FAMILY MEDICINE

## 2023-05-17 PROCEDURE — 3008F PR BODY MASS INDEX (BMI) DOCUMENTED: ICD-10-PCS | Mod: CPTII,,, | Performed by: FAMILY MEDICINE

## 2023-05-17 PROCEDURE — 3074F SYST BP LT 130 MM HG: CPT | Mod: CPTII,,, | Performed by: FAMILY MEDICINE

## 2023-05-17 PROCEDURE — 1101F PR PT FALLS ASSESS DOC 0-1 FALLS W/OUT INJ PAST YR: ICD-10-PCS | Mod: CPTII,,, | Performed by: FAMILY MEDICINE

## 2023-05-17 PROCEDURE — 3008F BODY MASS INDEX DOCD: CPT | Mod: CPTII,,, | Performed by: FAMILY MEDICINE

## 2023-05-17 PROCEDURE — 4010F PR ACE/ARB THEARPY RXD/TAKEN: ICD-10-PCS | Mod: CPTII,,, | Performed by: FAMILY MEDICINE

## 2023-05-17 PROCEDURE — 3074F PR MOST RECENT SYSTOLIC BLOOD PRESSURE < 130 MM HG: ICD-10-PCS | Mod: CPTII,,, | Performed by: FAMILY MEDICINE

## 2023-05-17 PROCEDURE — 1126F PR PAIN SEVERITY QUANTIFIED, NO PAIN PRESENT: ICD-10-PCS | Mod: CPTII,,, | Performed by: FAMILY MEDICINE

## 2023-05-17 PROCEDURE — 3288F PR FALLS RISK ASSESSMENT DOCUMENTED: ICD-10-PCS | Mod: CPTII,,, | Performed by: FAMILY MEDICINE

## 2023-05-17 PROCEDURE — 4010F ACE/ARB THERAPY RXD/TAKEN: CPT | Mod: CPTII,,, | Performed by: FAMILY MEDICINE

## 2023-05-17 PROCEDURE — 99397 PER PM REEVAL EST PAT 65+ YR: CPT | Mod: ,,, | Performed by: FAMILY MEDICINE

## 2023-05-17 PROCEDURE — 3288F FALL RISK ASSESSMENT DOCD: CPT | Mod: CPTII,,, | Performed by: FAMILY MEDICINE

## 2023-05-17 RX ORDER — ESOMEPRAZOLE MAGNESIUM 40 MG/1
40 CAPSULE, DELAYED RELEASE ORAL DAILY
Qty: 90 CAPSULE | Refills: 3 | Status: SHIPPED | OUTPATIENT
Start: 2023-05-17 | End: 2024-01-12 | Stop reason: SDUPTHER

## 2023-05-17 RX ORDER — ALPRAZOLAM 1 MG/1
1 TABLET ORAL 2 TIMES DAILY
Qty: 60 TABLET | Refills: 5 | Status: SHIPPED | OUTPATIENT
Start: 2023-05-17 | End: 2023-07-06

## 2023-05-17 NOTE — PROGRESS NOTES
Chief Complaint   Patient presents with    Hypertension     6 month follow up         This is a 68-year-old white male who presents for annual exam and 6 month f/u on chronic health issues    Overall doing well    CAD s/p stents, previous MI - tolerating Lipitor 80mg, toprol, lisinopril 2.5mg, Brilinta BID, ASA 81mg; following with cardiology, Dr. Olson  Anxiety -  Tolerating Effexor XR 75mg daily.  Mood is controlled. He is taking Xanax 1mg 1.5 - 2 tabs daily.   Barretts - doing well on the Nexium 40 mg daily  Hearing loss - Wearing hearing aids  BPH - getting up once nightly      Past Medical History:   Diagnosis Date    Anxiety     Diaz esophagus     BPH (benign prostatic hyperplasia)     CAD (coronary artery disease)     Essential tremor     HEARING LOSS     hearing aids    Hepatic cyst     History of MI (myocardial infarction)     HLD (hyperlipidemia)     Shingles     right upper back     Past Surgical History:   Procedure Laterality Date    CORONARY STENT PLACEMENT      RCA, LAD    LEFT HEART CATHETERIZATION Left 6/15/2018    Procedure: Left heart cath;  Surgeon: Gino Olson MD;  Location: [facility] CATH;  Service: Cardiology;  Laterality: Left;    NASAL SEPTUM SURGERY      TONSILLECTOMY, ADENOIDECTOMY, BILATERAL MYRINGOTOMY AND TUBES       Current Outpatient Medications on File Prior to Visit   Medication Sig Dispense Refill    aspirin (ECOTRIN) 81 MG EC tablet Take 1 tablet (81 mg total) by mouth once daily.  0    atorvastatin (LIPITOR) 80 MG tablet TAKE 1 TABLET BY MOUTH EVERY DAY IN THE EVENING 90 tablet 3    lisinopril (PRINIVIL,ZESTRIL) 2.5 MG tablet Take 1 tablet (2.5 mg total) by mouth once daily. 90 tablet 3    metoprolol succinate (TOPROL-XL) 25 MG 24 hr tablet Take 25 mg by mouth once daily.  10    sildenafil (REVATIO) 20 mg Tab TAKE 1 TABLET BY MOUTH THREE TIMES A DAY AS NEEDED 30 tablet 5    ticagrelor (BRILINTA) 90 mg tablet Take 1 tablet (90 mg total) by mouth 2 (two) times daily. 60 tablet 11     venlafaxine (EFFEXOR-XR) 75 MG 24 hr capsule TAKE 1 CAPSULE BY MOUTH EVERY DAY 90 capsule 3    [DISCONTINUED] ALPRAZolam (XANAX) 1 MG tablet TAKE 1 TABLET BY MOUTH TWICE A DAY 60 tablet 5    [DISCONTINUED] esomeprazole (NEXIUM) 40 MG capsule TAKE 1 CAPSULE BY MOUTH EVERY DAY 90 capsule 3    nitroGLYCERIN (NITROSTAT) 0.4 MG SL tablet Place 1 tablet (0.4 mg total) under the tongue every 5 (five) minutes as needed for Chest pain. (Patient not taking: Reported on 11/10/2021) 100 tablet 0     No current facility-administered medications on file prior to visit.         FAMILY HISTORY:  Father: CABG at 67, prostate cancer  Mother:alzheimer's  3 sisters  1 brother    SOCIAL HISTORY:  Tobacco use: variable (1/2 PPD)  Alcohol use:weekly wine  Ilicit drug use:denies  Occupation:residential development,   Marital status:  Children:5    REVIEW OF SYSTEMS:  GENERAL: No fever, chills, fatigability or weight changes.  SKIN/BREASTS: No rashes, sores, itching or changes in color or texture of skin.  HEAD: No headaches or recent head trauma.   EYES: Visual acuity fine. Denies blurriness, tearing, itching, photophobia, diplopia, or visual changes.   EARS: Denies ear pain, discharge, tinnitus or vertigo.  NOSE: No loss of smell, epistaxis, postnasal drip, discharge, obstruction, or sneezing.  MOUTH & THROAT: No hoarseness, change in voice, swallowing difficulty. No excessive gum bleeding.   HEMATOLOGICAL/NODES: Denies swollen glands. No bleeding or bruising.  CHEST: No KOVACS, cyanosis, wheezing, cough or sputum production.  CARDIOVASCULAR: Denies dyspnea, orthopnea, or palpitations.  GI/ABDOMEN: Appetite fine. No weight loss. Denies nausea, vomiting, diarrhea, constipation, abdominal pain, hematemesis or blood in stool.  URINARY: No dysuria,hematuria, nocturia, incontinence, flank pain, urgency, or urinary difficulty.  PERIPHERAL VASCULAR: No claudication, cold intolerance or cyanosis.  MUSCULOSKELETAL: No joint  "stiffness, pain, swelling, or loss of strength. Denies back pain.  NEUROLOGIC: No history of seizures, paralysis, alteration of gait or coordination.      PHYSICAL EXAM:  /60   Pulse 79   Temp 97.3 °F (36.3 °C) (Oral)   Resp 18   Ht 5' 7" (1.702 m)   Wt 83 kg (182 lb 15.7 oz)   SpO2 97%   BMI 28.66 kg/m²     APPEARANCE: Well nourished, well developed, neatly groomed, in no acute distress.   SKIN: Warm, moist and dry. No lesions or rashes.  NECK: Supple with full range of motion. No masses. No thyromegaly. No JVD or bruits.  CHEST: CTA bilaterally  CV: S1S2 rrr no m/r/g  EXTREMITIES: No edema or cyanosis. Pedal pulses 2+ bilaterally. No varicosities   Gait & Posture: Normal gait and fine motion.  PSYCH: Awake, alert, oriented to person, place, time, and situation. Pleasant and cooperative mood with intact judgment.    Results for orders placed or performed in visit on 05/04/23   Comprehensive Metabolic Panel   Result Value Ref Range    Sodium 138 136 - 145 mmol/L    Potassium 4.7 3.5 - 5.1 mmol/L    Chloride 104 95 - 110 mmol/L    CO2 25 23 - 29 mmol/L    Glucose 108 70 - 110 mg/dL    BUN 12 8 - 23 mg/dL    Creatinine 1.0 0.5 - 1.4 mg/dL    Calcium 9.2 8.7 - 10.5 mg/dL    Total Protein 7.0 6.0 - 8.4 g/dL    Albumin 3.6 3.5 - 5.2 g/dL    Total Bilirubin 0.7 0.1 - 1.0 mg/dL    Alkaline Phosphatase 111 55 - 135 U/L    AST 20 10 - 40 U/L    ALT 18 10 - 44 U/L    Anion Gap 9 8 - 16 mmol/L    eGFR >60.0 >60 mL/min/1.73 m^2   Lipid Panel   Result Value Ref Range    Cholesterol 105 (L) 120 - 199 mg/dL    Triglycerides 68 30 - 150 mg/dL    HDL 35 (L) 40 - 75 mg/dL    LDL Cholesterol 56.4 (L) 63.0 - 159.0 mg/dL    HDL/Cholesterol Ratio 33.3 20.0 - 50.0 %    Total Cholesterol/HDL Ratio 3.0 2.0 - 5.0    Non-HDL Cholesterol 70 mg/dL   PSA, Screening   Result Value Ref Range    PSA, Screen 4.8 (H) 0.00 - 4.00 ng/mL   CBC Auto Differential   Result Value Ref Range    WBC 10.95 3.90 - 12.70 K/uL    RBC 5.26 4.60 - 6.20 " M/uL    Hemoglobin 15.8 14.0 - 18.0 g/dL    Hematocrit 47.5 40.0 - 54.0 %    MCV 90 82 - 98 fL    MCH 30.0 27.0 - 31.0 pg    MCHC 33.3 32.0 - 36.0 g/dL    RDW 14.4 11.5 - 14.5 %    Platelets 263 150 - 450 K/uL    MPV 11.0 9.2 - 12.9 fL    Immature Granulocytes 0.3 0.0 - 0.5 %    Gran # (ANC) 5.4 1.8 - 7.7 K/uL    Immature Grans (Abs) 0.03 0.00 - 0.04 K/uL    Lymph # 4.1 1.0 - 4.8 K/uL    Mono # 1.0 0.3 - 1.0 K/uL    Eos # 0.4 0.0 - 0.5 K/uL    Baso # 0.04 0.00 - 0.20 K/uL    nRBC 0 0 /100 WBC    Gran % 49.1 38.0 - 73.0 %    Lymph % 37.8 18.0 - 48.0 %    Mono % 9.2 4.0 - 15.0 %    Eosinophil % 3.2 0.0 - 8.0 %    Basophil % 0.4 0.0 - 1.9 %    Differential Method Automated    TSH   Result Value Ref Range    TSH 3.959 0.400 - 4.000 uIU/mL      reviewed    ASSESSMENT/PLAN:    Preventative health care    Anxiety    Hypertension, unspecified type    Elevated PSA  -     Ambulatory referral/consult to Urology; Future; Expected date: 05/24/2023    SHAYAN (generalized anxiety disorder)  -     ALPRAZolam (XANAX) 1 MG tablet; Take 1 tablet (1 mg total) by mouth 2 (two) times daily.  Dispense: 60 tablet; Refill: 5    Diaz's esophagus without dysplasia  -     esomeprazole (NEXIUM) 40 MG capsule; Take 1 capsule (40 mg total) by mouth once daily.  Dispense: 90 capsule; Refill: 3      doing well  Counseled on regular exercise, maintenance of a healthy weight, balanced diet rich in fruits/vegetables and lean protein, and avoidance of unhealthy habits like smoking and excessive alcohol intake.  Xanax 1mg prn anxiety  Effexor 75mg daily  continue Nexium  Continue other present meds and annual cardiology follow-up    F/u 6 months

## 2023-07-05 DIAGNOSIS — F41.1 GAD (GENERALIZED ANXIETY DISORDER): ICD-10-CM

## 2023-07-06 RX ORDER — ALPRAZOLAM 1 MG/1
TABLET ORAL
Qty: 60 TABLET | Refills: 5 | Status: SHIPPED | OUTPATIENT
Start: 2023-07-06 | End: 2024-01-04

## 2023-07-06 NOTE — TELEPHONE ENCOUNTER
No care due was identified.  [facility] Embedded Care Due Messages. Reference number: [id number].   7/05/2023 8:39:47 PM CDT

## 2023-08-25 ENCOUNTER — TELEPHONE (OUTPATIENT)
Dept: FAMILY MEDICINE | Facility: CLINIC | Age: 68
End: 2023-08-25
Payer: MEDICARE

## 2023-08-25 NOTE — TELEPHONE ENCOUNTER
Left message on recorder that we received a clearance request from Dr. Arellano's office.  He need to schedule a preop exam before 9/14/23. Knee surgery

## 2023-08-28 ENCOUNTER — TELEPHONE (OUTPATIENT)
Dept: FAMILY MEDICINE | Facility: CLINIC | Age: 68
End: 2023-08-28
Payer: MEDICARE

## 2023-08-28 NOTE — TELEPHONE ENCOUNTER
Left message on recorder that we received a paper from Dr. Arellano office that he is needed a pre-op clearance. Please call the clinic to schedule.

## 2023-08-29 ENCOUNTER — TELEPHONE (OUTPATIENT)
Dept: FAMILY MEDICINE | Facility: CLINIC | Age: 68
End: 2023-08-29
Payer: MEDICARE

## 2023-08-29 ENCOUNTER — PATIENT MESSAGE (OUTPATIENT)
Dept: FAMILY MEDICINE | Facility: CLINIC | Age: 68
End: 2023-08-29
Payer: MEDICARE

## 2023-08-29 NOTE — TELEPHONE ENCOUNTER
----- Message from Luisa Olvera sent at 8/28/2023  3:23 PM CDT -----  Regarding: Patient Returning Call  Contact: patient at [phone]  Type:  Patient Returning Call    Who Called:  patient at [phone]    Who Left Message for Patient:  Kristine Recio MA   Does the patient know what this is regarding?:  yes    Additional Information:  Please call and advise. Thank you        
Called pt to get scheduled per note from last encounter by . got pt scheduled. To see .   
Never

## 2023-09-07 ENCOUNTER — OFFICE VISIT (OUTPATIENT)
Dept: FAMILY MEDICINE | Facility: CLINIC | Age: 68
End: 2023-09-07
Payer: MEDICARE

## 2023-09-07 VITALS
WEIGHT: 184.69 LBS | RESPIRATION RATE: 18 BRPM | DIASTOLIC BLOOD PRESSURE: 62 MMHG | SYSTOLIC BLOOD PRESSURE: 118 MMHG | OXYGEN SATURATION: 96 % | TEMPERATURE: 98 F | HEIGHT: 67 IN | BODY MASS INDEX: 28.99 KG/M2 | HEART RATE: 78 BPM

## 2023-09-07 DIAGNOSIS — Z01.818 PREOP EXAMINATION: Primary | ICD-10-CM

## 2023-09-07 DIAGNOSIS — I10 HYPERTENSION, UNSPECIFIED TYPE: ICD-10-CM

## 2023-09-07 DIAGNOSIS — I25.110 CORONARY ARTERY DISEASE INVOLVING NATIVE CORONARY ARTERY OF NATIVE HEART WITH UNSTABLE ANGINA PECTORIS: ICD-10-CM

## 2023-09-07 DIAGNOSIS — M17.11 PRIMARY OSTEOARTHRITIS OF RIGHT KNEE: ICD-10-CM

## 2023-09-07 PROCEDURE — 1159F PR MEDICATION LIST DOCUMENTED IN MEDICAL RECORD: ICD-10-PCS | Mod: CPTII,S$GLB,, | Performed by: FAMILY MEDICINE

## 2023-09-07 PROCEDURE — 1159F MED LIST DOCD IN RCRD: CPT | Mod: CPTII,S$GLB,, | Performed by: FAMILY MEDICINE

## 2023-09-07 PROCEDURE — 1101F PR PT FALLS ASSESS DOC 0-1 FALLS W/OUT INJ PAST YR: ICD-10-PCS | Mod: CPTII,S$GLB,, | Performed by: FAMILY MEDICINE

## 2023-09-07 PROCEDURE — 3078F PR MOST RECENT DIASTOLIC BLOOD PRESSURE < 80 MM HG: ICD-10-PCS | Mod: CPTII,S$GLB,, | Performed by: FAMILY MEDICINE

## 2023-09-07 PROCEDURE — 4010F PR ACE/ARB THEARPY RXD/TAKEN: ICD-10-PCS | Mod: CPTII,S$GLB,, | Performed by: FAMILY MEDICINE

## 2023-09-07 PROCEDURE — 1125F PR PAIN SEVERITY QUANTIFIED, PAIN PRESENT: ICD-10-PCS | Mod: CPTII,S$GLB,, | Performed by: FAMILY MEDICINE

## 2023-09-07 PROCEDURE — 99999 PR PBB SHADOW E&M-EST. PATIENT-LVL III: CPT | Mod: PBBFAC,,, | Performed by: FAMILY MEDICINE

## 2023-09-07 PROCEDURE — 99999 PR PBB SHADOW E&M-EST. PATIENT-LVL III: ICD-10-PCS | Mod: PBBFAC,,, | Performed by: FAMILY MEDICINE

## 2023-09-07 PROCEDURE — 99214 OFFICE O/P EST MOD 30 MIN: CPT | Mod: S$GLB,,, | Performed by: FAMILY MEDICINE

## 2023-09-07 PROCEDURE — 99214 PR OFFICE/OUTPT VISIT, EST, LEVL IV, 30-39 MIN: ICD-10-PCS | Mod: S$GLB,,, | Performed by: FAMILY MEDICINE

## 2023-09-07 PROCEDURE — 1125F AMNT PAIN NOTED PAIN PRSNT: CPT | Mod: CPTII,S$GLB,, | Performed by: FAMILY MEDICINE

## 2023-09-07 PROCEDURE — 3288F PR FALLS RISK ASSESSMENT DOCUMENTED: ICD-10-PCS | Mod: CPTII,S$GLB,, | Performed by: FAMILY MEDICINE

## 2023-09-07 PROCEDURE — 3008F BODY MASS INDEX DOCD: CPT | Mod: CPTII,S$GLB,, | Performed by: FAMILY MEDICINE

## 2023-09-07 PROCEDURE — 3288F FALL RISK ASSESSMENT DOCD: CPT | Mod: CPTII,S$GLB,, | Performed by: FAMILY MEDICINE

## 2023-09-07 PROCEDURE — 1101F PT FALLS ASSESS-DOCD LE1/YR: CPT | Mod: CPTII,S$GLB,, | Performed by: FAMILY MEDICINE

## 2023-09-07 PROCEDURE — 3008F PR BODY MASS INDEX (BMI) DOCUMENTED: ICD-10-PCS | Mod: CPTII,S$GLB,, | Performed by: FAMILY MEDICINE

## 2023-09-07 PROCEDURE — 3074F PR MOST RECENT SYSTOLIC BLOOD PRESSURE < 130 MM HG: ICD-10-PCS | Mod: CPTII,S$GLB,, | Performed by: FAMILY MEDICINE

## 2023-09-07 PROCEDURE — 3074F SYST BP LT 130 MM HG: CPT | Mod: CPTII,S$GLB,, | Performed by: FAMILY MEDICINE

## 2023-09-07 PROCEDURE — 4010F ACE/ARB THERAPY RXD/TAKEN: CPT | Mod: CPTII,S$GLB,, | Performed by: FAMILY MEDICINE

## 2023-09-07 PROCEDURE — 3078F DIAST BP <80 MM HG: CPT | Mod: CPTII,S$GLB,, | Performed by: FAMILY MEDICINE

## 2023-09-07 RX ORDER — CLOPIDOGREL BISULFATE 75 MG/1
TABLET ORAL
COMMUNITY
Start: 2023-09-01 | End: 2024-01-12 | Stop reason: SDUPTHER

## 2023-09-07 NOTE — PROGRESS NOTES
Chief Complaint   Patient presents with    Pre-op Exam     Knee surgery 9/14, Dr Arellano       This is a 68-year-old white male who presents for preop clearance for Dr. Arellano for right TKA.    Knee is giving him pain and stiffness on a regular basis.    Overall doing well    CAD s/p stents, previous MI - tolerating Lipitor 80mg, toprol, lisinopril 2.5mg, Plavix 75mg, ASA 81mg; following with cardiology, Dr. Olson  Anxiety -  Tolerating Effexor XR 75mg daily.  Mood is controlled. He is taking Xanax 1mg 1.5 - 2 tabs daily.   Barretts - doing well on the Nexium 40 mg daily  Hearing loss - Wearing hearing aids  BPH - getting up once nightly      Past Medical History:   Diagnosis Date    Anxiety     Diaz esophagus     BPH (benign prostatic hyperplasia)     CAD (coronary artery disease)     Essential tremor     HEARING LOSS     hearing aids    Hepatic cyst     History of MI (myocardial infarction)     HLD (hyperlipidemia)     Shingles     right upper back     Past Surgical History:   Procedure Laterality Date    CORONARY STENT PLACEMENT      RCA, LAD    LEFT HEART CATHETERIZATION Left 06/15/2018    Procedure: Left heart cath;  Surgeon: Gino Olson MD;  Location: [facility] CATH;  Service: Cardiology;  Laterality: Left;    NASAL SEPTUM SURGERY      PARTIAL KNEE ARTHROPLASTY Left     TONSILLECTOMY, ADENOIDECTOMY, BILATERAL MYRINGOTOMY AND TUBES       Current Outpatient Medications on File Prior to Visit   Medication Sig Dispense Refill    ALPRAZolam (XANAX) 1 MG tablet TAKE 1 TABLET BY MOUTH TWICE A DAY 60 tablet 5    aspirin (ECOTRIN) 81 MG EC tablet Take 1 tablet (81 mg total) by mouth once daily.  0    atorvastatin (LIPITOR) 80 MG tablet TAKE 1 TABLET BY MOUTH EVERY DAY IN THE EVENING 90 tablet 3    esomeprazole (NEXIUM) 40 MG capsule Take 1 capsule (40 mg total) by mouth once daily. 90 capsule 3    lisinopril (PRINIVIL,ZESTRIL) 2.5 MG tablet Take 1 tablet (2.5 mg total) by mouth once daily. 90 tablet 3    metoprolol  succinate (TOPROL-XL) 25 MG 24 hr tablet Take 25 mg by mouth once daily.  10    sildenafil (REVATIO) 20 mg Tab TAKE 1 TABLET BY MOUTH THREE TIMES A DAY AS NEEDED 30 tablet 5    venlafaxine (EFFEXOR-XR) 75 MG 24 hr capsule TAKE 1 CAPSULE BY MOUTH EVERY DAY 90 capsule 3    [DISCONTINUED] ticagrelor (BRILINTA) 90 mg tablet Take 1 tablet (90 mg total) by mouth 2 (two) times daily. 60 tablet 11    clopidogreL (PLAVIX) 75 mg tablet Take by mouth.      nitroGLYCERIN (NITROSTAT) 0.4 MG SL tablet Place 1 tablet (0.4 mg total) under the tongue every 5 (five) minutes as needed for Chest pain. (Patient not taking: Reported on 11/10/2021) 100 tablet 0     No current facility-administered medications on file prior to visit.         FAMILY HISTORY:  Father: CABG at 67, prostate cancer  Mother:alzheimer's  3 sisters  1 brother    SOCIAL HISTORY:  Tobacco use: variable (1/2 PPD)  Alcohol use:weekly wine  Ilicit drug use:denies  Occupation:[employer],   Marital status:  Children:5    REVIEW OF SYSTEMS:  GENERAL: No fever, chills, fatigability or weight changes.  SKIN/BREASTS: No rashes, sores, itching or changes in color or texture of skin.  HEAD: No headaches or recent head trauma.   EYES: Visual acuity fine. Denies blurriness, tearing, itching, photophobia, diplopia, or visual changes.   EARS: Denies ear pain, discharge, tinnitus or vertigo.  NOSE: No loss of smell, epistaxis, postnasal drip, discharge, obstruction, or sneezing.  MOUTH & THROAT: No hoarseness, change in voice, swallowing difficulty. No excessive gum bleeding.   HEMATOLOGICAL/NODES: Denies swollen glands. No bleeding or bruising.  CHEST: No KOVACS, cyanosis, wheezing, cough or sputum production.  CARDIOVASCULAR: Denies dyspnea, orthopnea, or palpitations.  GI/ABDOMEN: Appetite fine. No weight loss. Denies nausea, vomiting, diarrhea, constipation, abdominal pain, hematemesis or blood in stool.  URINARY: No dysuria,hematuria, nocturia,  "incontinence, flank pain, urgency, or urinary difficulty.  PERIPHERAL VASCULAR: No claudication, cold intolerance or cyanosis.  MUSCULOSKELETAL: No joint stiffness, pain, swelling, or loss of strength. Denies back pain.  NEUROLOGIC: No history of seizures, paralysis, alteration of gait or coordination.      PHYSICAL EXAM:  /62   Pulse 78   Temp 97.6 °F (36.4 °C) (Oral)   Resp 18   Ht 5' 7" (1.702 m)   Wt 83.8 kg (184 lb 11.2 oz)   SpO2 96%   BMI 28.93 kg/m²     APPEARANCE: Well nourished, well developed, neatly groomed, in no acute distress.   SKIN: Warm, moist and dry. No lesions or rashes.  NECK: Supple with full range of motion. No masses. No thyromegaly. No JVD or bruits.  CHEST: CTA bilaterally  CV: S1S2 rrr no m/r/g  EXTREMITIES: No edema or cyanosis. Pedal pulses 2+ bilaterally. No varicosities   Gait & Posture: Normal gait and fine motion.  PSYCH: Awake, alert, oriented to person, place, time, and situation. Pleasant and cooperative mood with intact judgment.    Results for orders placed or performed in visit on 05/04/23   Comprehensive Metabolic Panel   Result Value Ref Range    Sodium 138 136 - 145 mmol/L    Potassium 4.7 3.5 - 5.1 mmol/L    Chloride 104 95 - 110 mmol/L    CO2 25 23 - 29 mmol/L    Glucose 108 70 - 110 mg/dL    BUN 12 8 - 23 mg/dL    Creatinine 1.0 0.5 - 1.4 mg/dL    Calcium 9.2 8.7 - 10.5 mg/dL    Total Protein 7.0 6.0 - 8.4 g/dL    Albumin 3.6 3.5 - 5.2 g/dL    Total Bilirubin 0.7 0.1 - 1.0 mg/dL    Alkaline Phosphatase 111 55 - 135 U/L    AST 20 10 - 40 U/L    ALT 18 10 - 44 U/L    Anion Gap 9 8 - 16 mmol/L    eGFR >60.0 >60 mL/min/1.73 m^2   Lipid Panel   Result Value Ref Range    Cholesterol 105 (L) 120 - 199 mg/dL    Triglycerides 68 30 - 150 mg/dL    HDL 35 (L) 40 - 75 mg/dL    LDL Cholesterol 56.4 (L) 63.0 - 159.0 mg/dL    HDL/Cholesterol Ratio 33.3 20.0 - 50.0 %    Total Cholesterol/HDL Ratio 3.0 2.0 - 5.0    Non-HDL Cholesterol 70 mg/dL   PSA, Screening   Result Value " Ref Range    PSA, Screen 4.8 (H) 0.00 - 4.00 ng/mL   CBC Auto Differential   Result Value Ref Range    WBC 10.95 3.90 - 12.70 K/uL    RBC 5.26 4.60 - 6.20 M/uL    Hemoglobin 15.8 14.0 - 18.0 g/dL    Hematocrit 47.5 40.0 - 54.0 %    MCV 90 82 - 98 fL    MCH 30.0 27.0 - 31.0 pg    MCHC 33.3 32.0 - 36.0 g/dL    RDW 14.4 11.5 - 14.5 %    Platelets 263 150 - 450 K/uL    MPV 11.0 9.2 - 12.9 fL    Immature Granulocytes 0.3 0.0 - 0.5 %    Gran # (ANC) 5.4 1.8 - 7.7 K/uL    Immature Grans (Abs) 0.03 0.00 - 0.04 K/uL    Lymph # 4.1 1.0 - 4.8 K/uL    Mono # 1.0 0.3 - 1.0 K/uL    Eos # 0.4 0.0 - 0.5 K/uL    Baso # 0.04 0.00 - 0.20 K/uL    nRBC 0 0 /100 WBC    Gran % 49.1 38.0 - 73.0 %    Lymph % 37.8 18.0 - 48.0 %    Mono % 9.2 4.0 - 15.0 %    Eosinophil % 3.2 0.0 - 8.0 %    Basophil % 0.4 0.0 - 1.9 %    Differential Method Automated    TSH   Result Value Ref Range    TSH 3.959 0.400 - 4.000 uIU/mL      reviewed    ASSESSMENT/PLAN:    Preop examination    Primary osteoarthritis of right knee    Hypertension, unspecified type    Coronary artery disease involving native coronary artery of native heart with unstable angina pectoris        doing well  Cleared to proceed with surgery as planned  Hold aspirin and Plavix per cardiology recommendations  Counseled on regular exercise, maintenance of a healthy weight, balanced diet rich in fruits/vegetables and lean protein, and avoidance of unhealthy habits like smoking and excessive alcohol intake.  Xanax 1mg prn anxiety  Effexor 75mg daily  continue Nexium  Continue other present meds and annual cardiology follow-up    F/u 6 months

## 2023-09-11 ENCOUNTER — PATIENT MESSAGE (OUTPATIENT)
Dept: FAMILY MEDICINE | Facility: CLINIC | Age: 68
End: 2023-09-11
Payer: MEDICARE

## 2023-09-19 ENCOUNTER — HOSPITAL ENCOUNTER (OUTPATIENT)
Dept: RADIOLOGY | Facility: HOSPITAL | Age: 68
Discharge: HOME | End: 2023-09-19
Attending: ORTHOPAEDIC SURGERY
Payer: MEDICARE

## 2023-09-19 DIAGNOSIS — R91.1 SOLITARY PULMONARY NODULE: ICD-10-CM

## 2023-09-19 PROCEDURE — 71260 CT THORAX DX C+: CPT | Mod: 26,,, | Performed by: RADIOLOGY

## 2023-09-19 PROCEDURE — 71260 CT THORAX DX C+: CPT | Mod: TC,PO

## 2023-09-19 PROCEDURE — 25500020 PHARM REV CODE 255: Mod: PO | Performed by: ORTHOPAEDIC SURGERY

## 2023-09-19 PROCEDURE — 71260 CT CHEST WITH CONTRAST: ICD-10-PCS | Mod: 26,,, | Performed by: RADIOLOGY

## 2023-09-19 RX ADMIN — IOHEXOL 75 ML: 350 INJECTION, SOLUTION INTRAVENOUS at 03:09

## 2023-11-17 ENCOUNTER — OFFICE VISIT (OUTPATIENT)
Dept: FAMILY MEDICINE | Facility: CLINIC | Age: 68
End: 2023-11-17
Payer: MEDICARE

## 2023-11-17 VITALS
OXYGEN SATURATION: 99 % | HEIGHT: 67 IN | BODY MASS INDEX: 29.24 KG/M2 | SYSTOLIC BLOOD PRESSURE: 112 MMHG | WEIGHT: 186.31 LBS | DIASTOLIC BLOOD PRESSURE: 66 MMHG | HEART RATE: 94 BPM

## 2023-11-17 DIAGNOSIS — I25.110 CORONARY ARTERY DISEASE INVOLVING NATIVE CORONARY ARTERY OF NATIVE HEART WITH UNSTABLE ANGINA PECTORIS: ICD-10-CM

## 2023-11-17 DIAGNOSIS — K22.70 BARRETT'S ESOPHAGUS WITHOUT DYSPLASIA: ICD-10-CM

## 2023-11-17 DIAGNOSIS — F41.9 ANXIETY: Primary | ICD-10-CM

## 2023-11-17 DIAGNOSIS — I10 HYPERTENSION, UNSPECIFIED TYPE: ICD-10-CM

## 2023-11-17 DIAGNOSIS — Z12.5 PROSTATE CANCER SCREENING: ICD-10-CM

## 2023-11-17 DIAGNOSIS — Z23 NEED FOR VACCINATION: ICD-10-CM

## 2023-11-17 PROCEDURE — 99999 PR PBB SHADOW E&M-EST. PATIENT-LVL III: CPT | Mod: PBBFAC,,, | Performed by: FAMILY MEDICINE

## 2023-11-17 PROCEDURE — 3074F SYST BP LT 130 MM HG: CPT | Mod: CPTII,S$GLB,, | Performed by: FAMILY MEDICINE

## 2023-11-17 PROCEDURE — 1101F PT FALLS ASSESS-DOCD LE1/YR: CPT | Mod: CPTII,S$GLB,, | Performed by: FAMILY MEDICINE

## 2023-11-17 PROCEDURE — G0008 FLU VACCINE - QUADRIVALENT - ADJUVANTED: ICD-10-PCS | Mod: S$GLB,,, | Performed by: FAMILY MEDICINE

## 2023-11-17 PROCEDURE — G0008 ADMIN INFLUENZA VIRUS VAC: HCPCS | Mod: S$GLB,,, | Performed by: FAMILY MEDICINE

## 2023-11-17 PROCEDURE — 3008F BODY MASS INDEX DOCD: CPT | Mod: CPTII,S$GLB,, | Performed by: FAMILY MEDICINE

## 2023-11-17 PROCEDURE — 90694 FLU VACCINE - QUADRIVALENT - ADJUVANTED: ICD-10-PCS | Mod: S$GLB,,, | Performed by: FAMILY MEDICINE

## 2023-11-17 PROCEDURE — 99214 OFFICE O/P EST MOD 30 MIN: CPT | Mod: S$GLB,,, | Performed by: FAMILY MEDICINE

## 2023-11-17 PROCEDURE — 3288F PR FALLS RISK ASSESSMENT DOCUMENTED: ICD-10-PCS | Mod: CPTII,S$GLB,, | Performed by: FAMILY MEDICINE

## 2023-11-17 PROCEDURE — 99999 PR PBB SHADOW E&M-EST. PATIENT-LVL III: ICD-10-PCS | Mod: PBBFAC,,, | Performed by: FAMILY MEDICINE

## 2023-11-17 PROCEDURE — 1125F AMNT PAIN NOTED PAIN PRSNT: CPT | Mod: CPTII,S$GLB,, | Performed by: FAMILY MEDICINE

## 2023-11-17 PROCEDURE — 3288F FALL RISK ASSESSMENT DOCD: CPT | Mod: CPTII,S$GLB,, | Performed by: FAMILY MEDICINE

## 2023-11-17 PROCEDURE — 1125F PR PAIN SEVERITY QUANTIFIED, PAIN PRESENT: ICD-10-PCS | Mod: CPTII,S$GLB,, | Performed by: FAMILY MEDICINE

## 2023-11-17 PROCEDURE — 4010F ACE/ARB THERAPY RXD/TAKEN: CPT | Mod: CPTII,S$GLB,, | Performed by: FAMILY MEDICINE

## 2023-11-17 PROCEDURE — 3078F DIAST BP <80 MM HG: CPT | Mod: CPTII,S$GLB,, | Performed by: FAMILY MEDICINE

## 2023-11-17 PROCEDURE — 1101F PR PT FALLS ASSESS DOC 0-1 FALLS W/OUT INJ PAST YR: ICD-10-PCS | Mod: CPTII,S$GLB,, | Performed by: FAMILY MEDICINE

## 2023-11-17 PROCEDURE — 1159F MED LIST DOCD IN RCRD: CPT | Mod: CPTII,S$GLB,, | Performed by: FAMILY MEDICINE

## 2023-11-17 PROCEDURE — 1159F PR MEDICATION LIST DOCUMENTED IN MEDICAL RECORD: ICD-10-PCS | Mod: CPTII,S$GLB,, | Performed by: FAMILY MEDICINE

## 2023-11-17 PROCEDURE — 3078F PR MOST RECENT DIASTOLIC BLOOD PRESSURE < 80 MM HG: ICD-10-PCS | Mod: CPTII,S$GLB,, | Performed by: FAMILY MEDICINE

## 2023-11-17 PROCEDURE — 3008F PR BODY MASS INDEX (BMI) DOCUMENTED: ICD-10-PCS | Mod: CPTII,S$GLB,, | Performed by: FAMILY MEDICINE

## 2023-11-17 PROCEDURE — 90694 VACC AIIV4 NO PRSRV 0.5ML IM: CPT | Mod: S$GLB,,, | Performed by: FAMILY MEDICINE

## 2023-11-17 PROCEDURE — 4010F PR ACE/ARB THEARPY RXD/TAKEN: ICD-10-PCS | Mod: CPTII,S$GLB,, | Performed by: FAMILY MEDICINE

## 2023-11-17 PROCEDURE — 99214 PR OFFICE/OUTPT VISIT, EST, LEVL IV, 30-39 MIN: ICD-10-PCS | Mod: S$GLB,,, | Performed by: FAMILY MEDICINE

## 2023-11-17 PROCEDURE — 3074F PR MOST RECENT SYSTOLIC BLOOD PRESSURE < 130 MM HG: ICD-10-PCS | Mod: CPTII,S$GLB,, | Performed by: FAMILY MEDICINE

## 2023-11-17 RX ORDER — TICAGRELOR 90 MG/1
TABLET ORAL
COMMUNITY
End: 2023-11-17

## 2023-11-17 NOTE — PROGRESS NOTES
Chief Complaint   Patient presents with    Knee Pain       This is a 68-year-old white male who presents for 6 month f/u.    Following with Dr. Arellano with anticipated right TKA.    Overall doing well    CAD s/p stents, previous MI - tolerating Lipitor 80mg, toprol, lisinopril 2.5mg, Plavix 75mg, ASA 81mg; following with cardiology, Dr. Olson  Anxiety -  Tolerating Effexor XR 75mg daily.  Mood is controlled. He is taking Xanax 1mg 1.5 - 2 tabs daily.   Barretts - doing well on the Nexium 40 mg daily  Hearing loss - Wearing hearing aids  BPH - getting up once nightly      Past Medical History:   Diagnosis Date    Anxiety     Diaz esophagus     BPH (benign prostatic hyperplasia)     CAD (coronary artery disease)     Essential tremor     HEARING LOSS     hearing aids    Hepatic cyst     History of MI (myocardial infarction)     HLD (hyperlipidemia)     Shingles     right upper back     Past Surgical History:   Procedure Laterality Date    CORONARY STENT PLACEMENT      RCA, LAD    LEFT HEART CATHETERIZATION Left 06/15/2018    Procedure: Left heart cath;  Surgeon: Gino Olson MD;  Location: [facility] CATH;  Service: Cardiology;  Laterality: Left;    NASAL SEPTUM SURGERY      PARTIAL KNEE ARTHROPLASTY Left     TONSILLECTOMY, ADENOIDECTOMY, BILATERAL MYRINGOTOMY AND TUBES       Current Outpatient Medications on File Prior to Visit   Medication Sig Dispense Refill    ALPRAZolam (XANAX) 1 MG tablet TAKE 1 TABLET BY MOUTH TWICE A DAY 60 tablet 5    aspirin (ECOTRIN) 81 MG EC tablet Take 1 tablet (81 mg total) by mouth once daily.  0    atorvastatin (LIPITOR) 80 MG tablet TAKE 1 TABLET BY MOUTH EVERY DAY IN THE EVENING 90 tablet 3    lisinopril (PRINIVIL,ZESTRIL) 2.5 MG tablet Take 1 tablet (2.5 mg total) by mouth once daily. 90 tablet 3    metoprolol succinate (TOPROL-XL) 25 MG 24 hr tablet Take 25 mg by mouth once daily.  10    sildenafil (REVATIO) 20 mg Tab TAKE 1 TABLET BY MOUTH THREE TIMES A DAY AS NEEDED 30 tablet 5     venlafaxine (EFFEXOR-XR) 75 MG 24 hr capsule TAKE 1 CAPSULE BY MOUTH EVERY DAY 90 capsule 3    clopidogreL (PLAVIX) 75 mg tablet Take by mouth.      esomeprazole (NEXIUM) 40 MG capsule Take 1 capsule (40 mg total) by mouth once daily. (Patient not taking: Reported on 11/17/2023) 90 capsule 3    nitroGLYCERIN (NITROSTAT) 0.4 MG SL tablet Place 1 tablet (0.4 mg total) under the tongue every 5 (five) minutes as needed for Chest pain. (Patient not taking: Reported on 11/10/2021) 100 tablet 0    [DISCONTINUED] BRILINTA 90 mg tablet Take by mouth.       No current facility-administered medications on file prior to visit.         FAMILY HISTORY:  Father: CABG at 67, prostate cancer  Mother:alzheimer's  3 sisters  1 brother    SOCIAL HISTORY:  Tobacco use: variable (1/2 PPD)  Alcohol use:weekly wine  Ilicit drug use:denies  Occupation:residential development,   Marital status:  Children:5    REVIEW OF SYSTEMS:  GENERAL: No fever, chills, fatigability or weight changes.  SKIN/BREASTS: No rashes, sores, itching or changes in color or texture of skin.  HEAD: No headaches or recent head trauma.   EYES: Visual acuity fine. Denies blurriness, tearing, itching, photophobia, diplopia, or visual changes.   EARS: Denies ear pain, discharge, tinnitus or vertigo.  NOSE: No loss of smell, epistaxis, postnasal drip, discharge, obstruction, or sneezing.  MOUTH & THROAT: No hoarseness, change in voice, swallowing difficulty. No excessive gum bleeding.   HEMATOLOGICAL/NODES: Denies swollen glands. No bleeding or bruising.  CHEST: No KOVACS, cyanosis, wheezing, cough or sputum production.  CARDIOVASCULAR: Denies dyspnea, orthopnea, or palpitations.  GI/ABDOMEN: Appetite fine. No weight loss. Denies nausea, vomiting, diarrhea, constipation, abdominal pain, hematemesis or blood in stool.  URINARY: No dysuria,hematuria, nocturia, incontinence, flank pain, urgency, or urinary difficulty.  PERIPHERAL VASCULAR: No claudication, cold  "intolerance or cyanosis.  MUSCULOSKELETAL: No joint stiffness, pain, swelling, or loss of strength. Denies back pain.  NEUROLOGIC: No history of seizures, paralysis, alteration of gait or coordination.      PHYSICAL EXAM:  /66   Pulse 94   Ht 5' 7" (1.702 m)   Wt 84.5 kg (186 lb 4.6 oz)   SpO2 99%   BMI 29.18 kg/m²     APPEARANCE: Well nourished, well developed, neatly groomed, in no acute distress.   SKIN: Warm, moist and dry. No lesions or rashes.  NECK: Supple with full range of motion. No masses. No thyromegaly. No JVD or bruits.  CHEST: CTA bilaterally  CV: S1S2 rrr no m/r/g  EXTREMITIES: No edema or cyanosis. Pedal pulses 2+ bilaterally. No varicosities   Gait & Posture: Normal gait and fine motion.  PSYCH: Awake, alert, oriented to person, place, time, and situation. Pleasant and cooperative mood with intact judgment.    Results for orders placed or performed in visit on 05/04/23   Comprehensive Metabolic Panel   Result Value Ref Range    Sodium 138 136 - 145 mmol/L    Potassium 4.7 3.5 - 5.1 mmol/L    Chloride 104 95 - 110 mmol/L    CO2 25 23 - 29 mmol/L    Glucose 108 70 - 110 mg/dL    BUN 12 8 - 23 mg/dL    Creatinine 1.0 0.5 - 1.4 mg/dL    Calcium 9.2 8.7 - 10.5 mg/dL    Total Protein 7.0 6.0 - 8.4 g/dL    Albumin 3.6 3.5 - 5.2 g/dL    Total Bilirubin 0.7 0.1 - 1.0 mg/dL    Alkaline Phosphatase 111 55 - 135 U/L    AST 20 10 - 40 U/L    ALT 18 10 - 44 U/L    Anion Gap 9 8 - 16 mmol/L    eGFR >60.0 >60 mL/min/1.73 m^2   Lipid Panel   Result Value Ref Range    Cholesterol 105 (L) 120 - 199 mg/dL    Triglycerides 68 30 - 150 mg/dL    HDL 35 (L) 40 - 75 mg/dL    LDL Cholesterol 56.4 (L) 63.0 - 159.0 mg/dL    HDL/Cholesterol Ratio 33.3 20.0 - 50.0 %    Total Cholesterol/HDL Ratio 3.0 2.0 - 5.0    Non-HDL Cholesterol 70 mg/dL   PSA, Screening   Result Value Ref Range    PSA, Screen 4.8 (H) 0.00 - 4.00 ng/mL   CBC Auto Differential   Result Value Ref Range    WBC 10.95 3.90 - 12.70 K/uL    RBC 5.26 " 4.60 - 6.20 M/uL    Hemoglobin 15.8 14.0 - 18.0 g/dL    Hematocrit 47.5 40.0 - 54.0 %    MCV 90 82 - 98 fL    MCH 30.0 27.0 - 31.0 pg    MCHC 33.3 32.0 - 36.0 g/dL    RDW 14.4 11.5 - 14.5 %    Platelets 263 150 - 450 K/uL    MPV 11.0 9.2 - 12.9 fL    Immature Granulocytes 0.3 0.0 - 0.5 %    Gran # (ANC) 5.4 1.8 - 7.7 K/uL    Immature Grans (Abs) 0.03 0.00 - 0.04 K/uL    Lymph # 4.1 1.0 - 4.8 K/uL    Mono # 1.0 0.3 - 1.0 K/uL    Eos # 0.4 0.0 - 0.5 K/uL    Baso # 0.04 0.00 - 0.20 K/uL    nRBC 0 0 /100 WBC    Gran % 49.1 38.0 - 73.0 %    Lymph % 37.8 18.0 - 48.0 %    Mono % 9.2 4.0 - 15.0 %    Eosinophil % 3.2 0.0 - 8.0 %    Basophil % 0.4 0.0 - 1.9 %    Differential Method Automated    TSH   Result Value Ref Range    TSH 3.959 0.400 - 4.000 uIU/mL      reviewed    ASSESSMENT/PLAN:    Anxiety    Hypertension, unspecified type  -     Comprehensive Metabolic Panel; Future; Expected date: 05/15/2024  -     Lipid Panel; Future; Expected date: 05/15/2024  -     TSH; Future; Expected date: 05/15/2024  -     CBC Auto Differential; Future; Expected date: 05/15/2024    Coronary artery disease involving native coronary artery of native heart with unstable angina pectoris    Diaz's esophagus without dysplasia    Prostate cancer screening  -     PSA, Screening; Future; Expected date: 05/15/2024      Flu shot  doing well  Counseled on regular exercise, maintenance of a healthy weight, balanced diet rich in fruits/vegetables and lean protein, and avoidance of unhealthy habits like smoking and excessive alcohol intake.  Xanax 1mg prn anxiety  Effexor 75mg daily  continue Nexium  Continue other present meds and annual cardiology follow-up    F/u 6 months with labs for physical

## 2024-01-03 DIAGNOSIS — F41.1 GAD (GENERALIZED ANXIETY DISORDER): ICD-10-CM

## 2024-01-03 NOTE — TELEPHONE ENCOUNTER
No care due was identified.  [facility] Embedded Care Due Messages. Reference number: [id number].   1/03/2024 2:18:47 PM CST

## 2024-01-04 RX ORDER — ALPRAZOLAM 1 MG/1
TABLET ORAL
Qty: 60 TABLET | Refills: 5 | Status: SHIPPED | OUTPATIENT
Start: 2024-01-04 | End: 2024-01-12 | Stop reason: SDUPTHER

## 2024-01-08 ENCOUNTER — PATIENT MESSAGE (OUTPATIENT)
Dept: FAMILY MEDICINE | Facility: CLINIC | Age: 69
End: 2024-01-08
Payer: MEDICARE

## 2024-01-09 ENCOUNTER — TELEPHONE (OUTPATIENT)
Dept: FAMILY MEDICINE | Facility: CLINIC | Age: 69
End: 2024-01-09
Payer: MEDICARE

## 2024-01-09 NOTE — TELEPHONE ENCOUNTER
Spoke with Christiano at [facility] pharmacy.  Christiano states that sx was Last filled on Jan 2nd.

## 2024-01-09 NOTE — TELEPHONE ENCOUNTER
"Spoke with pt.  Pt stated that he called pharmacy to refill medication.  The pharmasist stated that insurance company keeps kicking back medication statting that it is a duplicate prscrption and the medication has already been filled on 01/02/2024  I and the pharmastist told pt that he would have to call his insurance company to see where medication was filled.  Christiano at [facility] states that he can not see where medication was filled as it was not filled at his pharmacy.  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  Pt asked "can't you just call and tell my insurance company that I didn't get it"  I did advise pt that I can not do that for the simple fact that I do not know wether he picked it up or not and we are not allowed to "take patients word"  pt is not happy and stated that he will call his insurance company and he will give us and pharmacy a call back as he is tired of going "round and round and getting no where"    Pt also asked if the pharmacy can "put back the prescription we sent in and asked if we can send in a entirely new prescription"  I told patient that we sent in a new rx but the insurance its self is kicking it back  and that it has nothing to do with pharmacy or us.  I did advise pt to call insurance company to get to the bottom of it.  Pt stated the will call but Is not happy that he has to call instead of us or pharmacy "bc they will not believe him anyway"  "

## 2024-01-10 ENCOUNTER — OFFICE VISIT (OUTPATIENT)
Dept: SURGERY | Facility: CLINIC | Age: 69
End: 2024-01-10
Payer: MEDICARE

## 2024-01-10 VITALS
HEART RATE: 85 BPM | TEMPERATURE: 98 F | BODY MASS INDEX: 28.96 KG/M2 | DIASTOLIC BLOOD PRESSURE: 76 MMHG | SYSTOLIC BLOOD PRESSURE: 138 MMHG | WEIGHT: 184.5 LBS | HEIGHT: 67 IN

## 2024-01-10 DIAGNOSIS — Z01.818 PREOP EXAMINATION: Primary | ICD-10-CM

## 2024-01-10 PROCEDURE — 1159F MED LIST DOCD IN RCRD: CPT | Mod: CPTII,S$GLB,, | Performed by: SURGERY

## 2024-01-10 PROCEDURE — 3288F FALL RISK ASSESSMENT DOCD: CPT | Mod: CPTII,S$GLB,, | Performed by: SURGERY

## 2024-01-10 PROCEDURE — 46600 DIAGNOSTIC ANOSCOPY SPX: CPT | Mod: S$GLB,,, | Performed by: SURGERY

## 2024-01-10 PROCEDURE — 1101F PT FALLS ASSESS-DOCD LE1/YR: CPT | Mod: CPTII,S$GLB,, | Performed by: SURGERY

## 2024-01-10 PROCEDURE — 3078F DIAST BP <80 MM HG: CPT | Mod: CPTII,S$GLB,, | Performed by: SURGERY

## 2024-01-10 PROCEDURE — 99204 OFFICE O/P NEW MOD 45 MIN: CPT | Mod: 25,S$GLB,, | Performed by: SURGERY

## 2024-01-10 PROCEDURE — 93005 ELECTROCARDIOGRAM TRACING: CPT | Mod: PO

## 2024-01-10 PROCEDURE — 3075F SYST BP GE 130 - 139MM HG: CPT | Mod: CPTII,S$GLB,, | Performed by: SURGERY

## 2024-01-10 PROCEDURE — 3008F BODY MASS INDEX DOCD: CPT | Mod: CPTII,S$GLB,, | Performed by: SURGERY

## 2024-01-10 PROCEDURE — 1126F AMNT PAIN NOTED NONE PRSNT: CPT | Mod: CPTII,S$GLB,, | Performed by: SURGERY

## 2024-01-10 PROCEDURE — 99999 PR PBB SHADOW E&M-EST. PATIENT-LVL IV: CPT | Mod: PBBFAC,,, | Performed by: SURGERY

## 2024-01-10 NOTE — PROGRESS NOTES
Subjective     Patient ID: Jose Blanco is a 68 y.o. male.    Chief Complaint: Consult (Hx of Hemorrhoids/Rectal bleeding and discomfort)    HPI  Pleasant  69 yo M who is referred to me for evaluation of rectal bleeding and discomfort. He notes that he has tissue prolapse that he has to reduce.  Has pressure and discomfort.  Has also been having bleeding. He is referred to me for evaluation.  No other significant PSH. He does have history of CAD and is on plavix  Review of Systems   Constitutional:  Negative for activity change.   HENT:  Negative for dental problem.    Respiratory:  Negative for apnea.    Cardiovascular:  Negative for chest pain.   Gastrointestinal:  Positive for anal bleeding and rectal pain.   Musculoskeletal:  Negative for arthralgias.   Psychiatric/Behavioral:  Negative for agitation and decreased concentration.           Objective     Physical Exam  Vitals reviewed.   HENT:      Head: Normocephalic.   Cardiovascular:      Rate and Rhythm: Normal rate.      Pulses: Normal pulses.   Pulmonary:      Effort: Pulmonary effort is normal. No respiratory distress.      Breath sounds: No stridor. No rhonchi.   Abdominal:      General: Abdomen is flat.   Genitourinary:     Comments: Ext exam demonstrates prolapsing int hemorrhoid in R post position.  Large prolapsing hemorrhoids.  JUJU with normal sphincter tone.  Anoscopy demonstrating large int hemorrhoids in the R post and mild in R ant and L lateral position  Musculoskeletal:      Cervical back: Normal range of motion.   Neurological:      Mental Status: He is alert.   Psychiatric:         Mood and Affect: Mood normal.            Assessment and Plan     1. Preop examination  -     IN OFFICE EKG 12-LEAD (to Muse)    Hemorrhoid prolapse/ Bleeding hemorrhoids        D/w pt.  He has large prolapsing hemorrhoids in the R post position.  Some changes raise suspicion for possible villous adenoma prolapsing.  Given size and question of adenomatous changes  I have recommended removal in the OR.  Risk sof surgery explained to pt.  Informed consent obtained.  Proceed with surgery next week         No follow-ups on file.

## 2024-01-10 NOTE — H&P (VIEW-ONLY)
Subjective     Patient ID: Jose Blanco is a 68 y.o. male.    Chief Complaint: Consult (Hx of Hemorrhoids/Rectal bleeding and discomfort)    HPI  Pleasant  67 yo M who is referred to me for evaluation of rectal bleeding and discomfort. He notes that he has tissue prolapse that he has to reduce.  Has pressure and discomfort.  Has also been having bleeding. He is referred to me for evaluation.  No other significant PSH. He does have history of CAD and is on plavix  Review of Systems   Constitutional:  Negative for activity change.   HENT:  Negative for dental problem.    Respiratory:  Negative for apnea.    Cardiovascular:  Negative for chest pain.   Gastrointestinal:  Positive for anal bleeding and rectal pain.   Musculoskeletal:  Negative for arthralgias.   Psychiatric/Behavioral:  Negative for agitation and decreased concentration.           Objective     Physical Exam  Vitals reviewed.   HENT:      Head: Normocephalic.   Cardiovascular:      Rate and Rhythm: Normal rate.      Pulses: Normal pulses.   Pulmonary:      Effort: Pulmonary effort is normal. No respiratory distress.      Breath sounds: No stridor. No rhonchi.   Abdominal:      General: Abdomen is flat.   Genitourinary:     Comments: Ext exam demonstrates prolapsing int hemorrhoid in R post position.  Large prolapsing hemorrhoids.  JUJU with normal sphincter tone.  Anoscopy demonstrating large int hemorrhoids in the R post and mild in R ant and L lateral position  Musculoskeletal:      Cervical back: Normal range of motion.   Neurological:      Mental Status: He is alert.   Psychiatric:         Mood and Affect: Mood normal.            Assessment and Plan     1. Preop examination  -     IN OFFICE EKG 12-LEAD (to Muse)    Hemorrhoid prolapse/ Bleeding hemorrhoids        D/w pt.  He has large prolapsing hemorrhoids in the R post position.  Some changes raise suspicion for possible villous adenoma prolapsing.  Given size and question of adenomatous changes  I have recommended removal in the OR.  Risk sof surgery explained to pt.  Informed consent obtained.  Proceed with surgery next week         No follow-ups on file.

## 2024-01-11 RX ORDER — CEFAZOLIN SODIUM 2 G/50ML
2 SOLUTION INTRAVENOUS
Status: CANCELLED | OUTPATIENT
Start: 2024-01-11

## 2024-01-11 RX ORDER — SODIUM CHLORIDE 9 MG/ML
INJECTION, SOLUTION INTRAVENOUS CONTINUOUS
Status: CANCELLED | OUTPATIENT
Start: 2024-01-11

## 2024-01-12 DIAGNOSIS — K22.70 BARRETT'S ESOPHAGUS WITHOUT DYSPLASIA: ICD-10-CM

## 2024-01-12 DIAGNOSIS — F41.1 GAD (GENERALIZED ANXIETY DISORDER): ICD-10-CM

## 2024-01-12 RX ORDER — CLOPIDOGREL BISULFATE 75 MG/1
75 TABLET ORAL DAILY
Qty: 90 TABLET | Refills: 3 | Status: SHIPPED | OUTPATIENT
Start: 2024-01-12

## 2024-01-12 RX ORDER — METOPROLOL SUCCINATE 25 MG/1
25 TABLET, EXTENDED RELEASE ORAL DAILY
Qty: 90 TABLET | Refills: 3 | Status: SHIPPED | OUTPATIENT
Start: 2024-01-12

## 2024-01-12 RX ORDER — ALPRAZOLAM 1 MG/1
1 TABLET ORAL 2 TIMES DAILY
Qty: 60 TABLET | Refills: 5 | Status: SHIPPED | OUTPATIENT
Start: 2024-01-12

## 2024-01-12 RX ORDER — VENLAFAXINE HYDROCHLORIDE 75 MG/1
75 CAPSULE, EXTENDED RELEASE ORAL DAILY
Qty: 90 CAPSULE | Refills: 3 | Status: SHIPPED | OUTPATIENT
Start: 2024-01-12

## 2024-01-12 RX ORDER — ESOMEPRAZOLE MAGNESIUM 40 MG/1
40 CAPSULE, DELAYED RELEASE ORAL DAILY
Qty: 90 CAPSULE | Refills: 3 | Status: SHIPPED | OUTPATIENT
Start: 2024-01-12

## 2024-01-12 RX ORDER — ATORVASTATIN CALCIUM 80 MG/1
80 TABLET, FILM COATED ORAL NIGHTLY
Qty: 90 TABLET | Refills: 3 | Status: SHIPPED | OUTPATIENT
Start: 2024-01-12

## 2024-01-12 RX ORDER — LISINOPRIL 2.5 MG/1
2.5 TABLET ORAL DAILY
Qty: 90 TABLET | Refills: 3
Start: 2024-01-12 | End: 2025-01-11

## 2024-01-12 NOTE — TELEPHONE ENCOUNTER
No care due was identified.  [facility] Embedded Care Due Messages. Reference number: [id number].   1/12/2024 2:50:32 PM CST

## 2024-01-16 ENCOUNTER — ANESTHESIA EVENT (OUTPATIENT)
Dept: SURGERY | Facility: HOSPITAL | Age: 69
End: 2024-01-16
Payer: MEDICARE

## 2024-01-17 ENCOUNTER — HOSPITAL ENCOUNTER (OUTPATIENT)
Facility: HOSPITAL | Age: 69
Discharge: HOME | End: 2024-01-17
Attending: SURGERY | Admitting: SURGERY
Payer: MEDICARE

## 2024-01-17 ENCOUNTER — ANESTHESIA (OUTPATIENT)
Dept: SURGERY | Facility: HOSPITAL | Age: 69
End: 2024-01-17
Payer: MEDICARE

## 2024-01-17 VITALS
TEMPERATURE: 98 F | HEART RATE: 79 BPM | RESPIRATION RATE: 15 BRPM | OXYGEN SATURATION: 99 % | SYSTOLIC BLOOD PRESSURE: 138 MMHG | WEIGHT: 184 LBS | DIASTOLIC BLOOD PRESSURE: 76 MMHG | BODY MASS INDEX: 28.88 KG/M2 | HEIGHT: 67 IN

## 2024-01-17 DIAGNOSIS — K64.8 HEMORRHOID PROLAPSE: Primary | ICD-10-CM

## 2024-01-17 DIAGNOSIS — Z01.818 PREOP EXAMINATION: ICD-10-CM

## 2024-01-17 PROCEDURE — 36000706: Mod: PO | Performed by: SURGERY

## 2024-01-17 PROCEDURE — 25000003 PHARM REV CODE 250: Mod: PO | Performed by: NURSE ANESTHETIST, CERTIFIED REGISTERED

## 2024-01-17 PROCEDURE — 63600175 PHARM REV CODE 636 W HCPCS: Mod: PO | Performed by: SURGERY

## 2024-01-17 PROCEDURE — 63600175 PHARM REV CODE 636 W HCPCS: Mod: JZ,JG,PO | Performed by: SURGERY

## 2024-01-17 PROCEDURE — 71000015 HC POSTOP RECOV 1ST HR: Mod: PO | Performed by: SURGERY

## 2024-01-17 PROCEDURE — 88304 TISSUE EXAM BY PATHOLOGIST: CPT | Mod: 26,,, | Performed by: STUDENT IN AN ORGANIZED HEALTH CARE EDUCATION/TRAINING PROGRAM

## 2024-01-17 PROCEDURE — 37000008 HC ANESTHESIA 1ST 15 MINUTES: Mod: PO | Performed by: SURGERY

## 2024-01-17 PROCEDURE — 63600175 PHARM REV CODE 636 W HCPCS: Mod: PO | Performed by: NURSE ANESTHETIST, CERTIFIED REGISTERED

## 2024-01-17 PROCEDURE — 37000009 HC ANESTHESIA EA ADD 15 MINS: Mod: PO | Performed by: SURGERY

## 2024-01-17 PROCEDURE — 46260 REMOVE IN/EX HEM GROUPS 2+: CPT | Mod: ,,, | Performed by: SURGERY

## 2024-01-17 PROCEDURE — D9220A PRA ANESTHESIA: Mod: ANES,,, | Performed by: ANESTHESIOLOGY

## 2024-01-17 PROCEDURE — D9220A PRA ANESTHESIA: Mod: CRNA,,, | Performed by: NURSE ANESTHETIST, CERTIFIED REGISTERED

## 2024-01-17 PROCEDURE — C9290 INJ, BUPIVACAINE LIPOSOME: HCPCS | Mod: PO | Performed by: SURGERY

## 2024-01-17 PROCEDURE — 25000003 PHARM REV CODE 250: Mod: PO | Performed by: SURGERY

## 2024-01-17 PROCEDURE — 63600175 PHARM REV CODE 636 W HCPCS: Mod: PO | Performed by: ANESTHESIOLOGY

## 2024-01-17 PROCEDURE — 71000033 HC RECOVERY, INTIAL HOUR: Mod: PO | Performed by: SURGERY

## 2024-01-17 PROCEDURE — 88304 TISSUE EXAM BY PATHOLOGIST: CPT | Mod: 59,PO | Performed by: STUDENT IN AN ORGANIZED HEALTH CARE EDUCATION/TRAINING PROGRAM

## 2024-01-17 PROCEDURE — 36000707: Mod: PO | Performed by: SURGERY

## 2024-01-17 PROCEDURE — 27201423 OPTIME MED/SURG SUP & DEVICES STERILE SUPPLY: Mod: PO | Performed by: SURGERY

## 2024-01-17 RX ORDER — ACETAMINOPHEN 10 MG/ML
INJECTION, SOLUTION INTRAVENOUS
Status: DISCONTINUED | OUTPATIENT
Start: 2024-01-17 | End: 2024-01-17

## 2024-01-17 RX ORDER — PROPOFOL 10 MG/ML
VIAL (ML) INTRAVENOUS
Status: DISCONTINUED | OUTPATIENT
Start: 2024-01-17 | End: 2024-01-17

## 2024-01-17 RX ORDER — HYDROMORPHONE HYDROCHLORIDE 2 MG/ML
0.2 INJECTION, SOLUTION INTRAMUSCULAR; INTRAVENOUS; SUBCUTANEOUS EVERY 5 MIN PRN
Status: DISCONTINUED | OUTPATIENT
Start: 2024-01-17 | End: 2024-01-27

## 2024-01-17 RX ORDER — HYDROCODONE BITARTRATE AND ACETAMINOPHEN 5; 325 MG/1; MG/1
1 TABLET ORAL EVERY 6 HOURS PRN
Qty: 30 TABLET | Refills: 0 | Status: SHIPPED | OUTPATIENT
Start: 2024-01-17 | End: 2024-01-26 | Stop reason: SDUPTHER

## 2024-01-17 RX ORDER — MIDAZOLAM HYDROCHLORIDE 1 MG/ML
INJECTION, SOLUTION INTRAMUSCULAR; INTRAVENOUS
Status: DISCONTINUED | OUTPATIENT
Start: 2024-01-17 | End: 2024-01-17

## 2024-01-17 RX ORDER — FENTANYL CITRATE 50 UG/ML
25 INJECTION, SOLUTION INTRAMUSCULAR; INTRAVENOUS EVERY 5 MIN PRN
Status: ACTIVE | OUTPATIENT
Start: 2024-01-17 | End: 2024-01-17

## 2024-01-17 RX ORDER — LIDOCAINE HYDROCHLORIDE 10 MG/ML
1 INJECTION, SOLUTION EPIDURAL; INFILTRATION; INTRACAUDAL; PERINEURAL ONCE
Status: ACTIVE | OUTPATIENT
Start: 2024-01-17

## 2024-01-17 RX ORDER — SODIUM CHLORIDE, SODIUM LACTATE, POTASSIUM CHLORIDE, CALCIUM CHLORIDE 600; 310; 30; 20 MG/100ML; MG/100ML; MG/100ML; MG/100ML
INJECTION, SOLUTION INTRAVENOUS CONTINUOUS
Status: DISPENSED | OUTPATIENT
Start: 2024-01-17

## 2024-01-17 RX ORDER — BUPIVACAINE HYDROCHLORIDE 2.5 MG/ML
INJECTION, SOLUTION EPIDURAL; INFILTRATION; INTRACAUDAL
Status: DISCONTINUED | OUTPATIENT
Start: 2024-01-17 | End: 2024-01-17 | Stop reason: HOSPADM

## 2024-01-17 RX ORDER — SODIUM CHLORIDE 9 MG/ML
INJECTION, SOLUTION INTRAVENOUS CONTINUOUS
Status: DISCONTINUED | OUTPATIENT
Start: 2024-01-17 | End: 2024-01-17 | Stop reason: HOSPADM

## 2024-01-17 RX ORDER — FENTANYL CITRATE 50 UG/ML
INJECTION, SOLUTION INTRAMUSCULAR; INTRAVENOUS
Status: DISCONTINUED | OUTPATIENT
Start: 2024-01-17 | End: 2024-01-17

## 2024-01-17 RX ORDER — HYDROCODONE BITARTRATE AND ACETAMINOPHEN 5; 325 MG/1; MG/1
1 TABLET ORAL EVERY 4 HOURS PRN
Status: DISCONTINUED | OUTPATIENT
Start: 2024-01-17 | End: 2024-01-17 | Stop reason: HOSPADM

## 2024-01-17 RX ORDER — DEXAMETHASONE SODIUM PHOSPHATE 4 MG/ML
8 INJECTION, SOLUTION INTRA-ARTICULAR; INTRALESIONAL; INTRAMUSCULAR; INTRAVENOUS; SOFT TISSUE
Status: COMPLETED | OUTPATIENT
Start: 2024-01-17 | End: 2024-01-17

## 2024-01-17 RX ORDER — LIDOCAINE HYDROCHLORIDE 20 MG/ML
INJECTION INTRAVENOUS
Status: DISCONTINUED | OUTPATIENT
Start: 2024-01-17 | End: 2024-01-17

## 2024-01-17 RX ORDER — ONDANSETRON HYDROCHLORIDE 2 MG/ML
INJECTION, SOLUTION INTRAVENOUS
Status: DISCONTINUED | OUTPATIENT
Start: 2024-01-17 | End: 2024-01-17

## 2024-01-17 RX ORDER — OXYCODONE HYDROCHLORIDE 5 MG/1
5 TABLET ORAL
Status: DISCONTINUED | OUTPATIENT
Start: 2024-01-17 | End: 2024-01-27

## 2024-01-17 RX ORDER — DEXMEDETOMIDINE HYDROCHLORIDE 100 UG/ML
INJECTION, SOLUTION INTRAVENOUS
Status: DISCONTINUED | OUTPATIENT
Start: 2024-01-17 | End: 2024-01-17

## 2024-01-17 RX ORDER — PROPOFOL 10 MG/ML
VIAL (ML) INTRAVENOUS CONTINUOUS PRN
Status: DISCONTINUED | OUTPATIENT
Start: 2024-01-17 | End: 2024-01-17

## 2024-01-17 RX ORDER — ONDANSETRON HYDROCHLORIDE 2 MG/ML
4 INJECTION, SOLUTION INTRAVENOUS EVERY 12 HOURS PRN
Status: DISCONTINUED | OUTPATIENT
Start: 2024-01-17 | End: 2024-01-17 | Stop reason: HOSPADM

## 2024-01-17 RX ORDER — CEFAZOLIN SODIUM 2 G/50ML
2 SOLUTION INTRAVENOUS
Status: DISCONTINUED | OUTPATIENT
Start: 2024-01-17 | End: 2024-01-17 | Stop reason: HOSPADM

## 2024-01-17 RX ADMIN — FENTANYL CITRATE 50 MCG: 0.05 INJECTION, SOLUTION INTRAMUSCULAR; INTRAVENOUS at 10:01

## 2024-01-17 RX ADMIN — ONDANSETRON 4 MG: 2 INJECTION, SOLUTION INTRAMUSCULAR; INTRAVENOUS at 11:01

## 2024-01-17 RX ADMIN — DEXMEDETOMIDINE HYDROCHLORIDE 10 MCG: 100 INJECTION, SOLUTION, CONCENTRATE INTRAVENOUS at 10:01

## 2024-01-17 RX ADMIN — CEFAZOLIN SODIUM 2 G: 2 SOLUTION INTRAVENOUS at 10:01

## 2024-01-17 RX ADMIN — MIDAZOLAM HYDROCHLORIDE 2 MG: 1 INJECTION, SOLUTION INTRAMUSCULAR; INTRAVENOUS at 10:01

## 2024-01-17 RX ADMIN — PROPOFOL 100 MCG/KG/MIN: 10 INJECTION, EMULSION INTRAVENOUS at 10:01

## 2024-01-17 RX ADMIN — DEXAMETHASONE SODIUM PHOSPHATE 8 MG: 4 INJECTION INTRA-ARTICULAR; INTRALESIONAL; INTRAMUSCULAR; INTRAVENOUS; SOFT TISSUE at 10:01

## 2024-01-17 RX ADMIN — LIDOCAINE HYDROCHLORIDE 100 MG: 20 INJECTION INTRAVENOUS at 10:01

## 2024-01-17 RX ADMIN — ACETAMINOPHEN 1000 MG: 10 INJECTION, SOLUTION INTRAVENOUS at 11:01

## 2024-01-17 RX ADMIN — SODIUM CHLORIDE, POTASSIUM CHLORIDE, SODIUM LACTATE AND CALCIUM CHLORIDE: 600; 310; 30; 20 INJECTION, SOLUTION INTRAVENOUS at 10:01

## 2024-01-17 RX ADMIN — PROPOFOL 30 MG: 10 INJECTION, EMULSION INTRAVENOUS at 10:01

## 2024-01-17 NOTE — ANESTHESIA POSTPROCEDURE EVALUATION
Anesthesia Post Evaluation    Patient: Jose Blanco    Procedure(s) Performed: Procedure(s) (LRB):  HEMORRHOIDECTOMY (N/A)    Final Anesthesia Type: MAC      Patient location during evaluation: PACU  Patient participation: Yes- Able to Participate  Level of consciousness: awake and alert  Post-procedure vital signs: reviewed and stable  Pain management: adequate  Airway patency: patent    PONV status at discharge: No PONV  Anesthetic complications: no      Cardiovascular status: blood pressure returned to baseline  Respiratory status: unassisted and room air  Hydration status: euvolemic  Follow-up not needed.              Vitals Value Taken Time   /76 01/17/24 1207   Temp  01/17/24 1253   Pulse 79 01/17/24 1207   Resp 15 01/17/24 1207   SpO2 99 % 01/17/24 1207         Event Time   Out of Recovery 12:10:00         Pain/Lashonda Score: Lashonda Score: 10 (1/17/2024 12:02 PM)

## 2024-01-17 NOTE — OP NOTE
Date of procedure:  January 17, 2024     Staff surgeon:  Dr. Kwame Zee     Preoperative diagnosis:  Hemorrhoid prolapse     Postop diagnosis:  The same    Procedure:  3 column hemorrhoidectomy (internal and external)     Anesthesia:  Monitored anesthesia    Indication for procedure:  68-year-old gentleman presented my office with large prolapsing hemorrhoids causing pressure discomfort occasional bleeding.  Exam in the office the 1 in the right posterior position did demonstrate some findings concerning for possible adenomatous changes.  He was recommended for surgical excision.      Description of procedure:  Following signing informed consent patient was taken the operating room placed supine position.  He was flipped and placed in prone ariel-knife position.  Buttocks taped apart.  Monitored anesthesia was administered.  Areas prepped and draped standard fashion.  An appropriate time-out procedure was performed.  I inject the area with Exparel mixed with Marcaine.  Having achieved the adequate perianal block I began in the right posterior position.  I tri angulated the external hemorrhoidal column.  Harmonic scalpel was used to cut the external component of the harmonic scalpel.  A proceed proximally taken the hemorrhoidal column off of the internal sphincter muscle.  This is a large prolapsing internal component which does have some features concerning for possible villous adenoma.  I removed the entirety of this without event.  Defect was then closed with 3-0 Vicryl.  I next repeat the same steps for the hemorrhoidal column in the right anterior position and also in the left lateral position.  Areas injected with Exparel mixed with Marcaine again.  Gelfoam was then placed into the anal canal.  Patient was awakened taken recovery room stable condition.  There were no immediate complications.  Blood loss was 10 cc

## 2024-01-17 NOTE — BRIEF OP NOTE
[facility] - Surgery  Brief Operative Note    Surgery Date: 1/17/2024     Surgeon(s) and Role:     * Kwame Zee MD - Primary    Assisting Surgeon: None    Pre-op Diagnosis:  Hemorrhoid prolapse  Post-op Diagnosis:  hemorrhoid prolapse    Procedure(s) (LRB):  HEMORRHOIDECTOMY (N/A)    Anesthesia: General    Operative Findings: Large prolapsing hemorrhoids in all columns    Estimated Blood Loss: 15 cc's         Specimens:   Specimen (24h ago, onward)      None              Discharge Note    OUTCOME: Patient tolerated treatment/procedure well without complication and is now ready for discharge.    DISPOSITION: Home or Self Care    FINAL DIAGNOSIS:  Hemorrhoid prolapse    FOLLOWUP: In clinic    DISCHARGE INSTRUCTIONS:    Discharge Procedure Orders   Diet general     Call MD for:  extreme fatigue     Call MD for:  persistent dizziness or light-headedness     Call MD for:  hives     Call MD for:  redness, tenderness, or signs of infection (pain, swelling, redness, odor or green/yellow discharge around incision site)     Call MD for:  difficulty breathing, headache or visual disturbances     Call MD for:  severe uncontrolled pain     Call MD for:  persistent nausea and vomiting     Call MD for:  temperature >100.4     Remove dressing in 48 hours     Activity as tolerated

## 2024-01-17 NOTE — DISCHARGE INSTRUCTIONS
Rectal surgery    DO:   Avoid strenuous activity for 2 weeks.   Expect a small amount of bleeding after bowel movements.   Perform sitz baths 2-3 times per day and after bowel movements.   Begin a high fiber diet.   Take a fiber supplement or stool softener daily.   Drink 8-10 cups of water daily to prevent constipation.   Shower in 24 hours.   Remove outer dressing with first bowel movement. The foam packing will come out by itself.   Rest on the side that relieves pressure.   Resume all home medications.    DO NOT:   Do not use laxatives.   Do not lift more than 15 lbs until released by physician.   Do not drive for next 24 hours or while taking narcotic medications.  Do not take additional tylenol/acetaminophen while taking narcotic pain medication that contains tylenol/acetaminophen.     WHEN TO CALL THE DOCTOR:  Signs of infection, such as redness, excessive swelling, or pus drainage.   Increased bleeding  Fever greater than 101.     Schedule a follow-up appointment in 2 weeks.  [phone]          
Yes

## 2024-01-17 NOTE — PLAN OF CARE
Pt VSS, denies recent fever or illness. Belongings placed under stretcher, valuables in pt locker. Consents to be signed by pt. No family in waiting area. Pt ready for procedure.

## 2024-01-17 NOTE — TRANSFER OF CARE
"Anesthesia Transfer of Care Note    Patient: Jose Blanco    Procedure(s) Performed: Procedure(s) (LRB):  HEMORRHOIDECTOMY (N/A)    Patient location: PACU    Anesthesia Type: MAC    Transport from OR: Transported from OR on room air with adequate spontaneous ventilation    Post pain: adequate analgesia    Post assessment: no apparent anesthetic complications and tolerated procedure well    Post vital signs: stable    Level of consciousness: awake, alert and oriented    Nausea/Vomiting: no nausea/vomiting    Complications: none    Transfer of care protocol was followed      Last vitals: Visit Vitals  /84 (BP Location: Right arm, Patient Position: Lying)   Pulse 67   Temp 36.7 °C (98 °F) (Skin)   Resp 16   Ht 5' 7" (1.702 m)   Wt 83.5 kg (184 lb)   SpO2 96%   BMI 28.82 kg/m²     "

## 2024-01-17 NOTE — ANESTHESIA PREPROCEDURE EVALUATION
01/17/2024  Jose Blanco is a 68 y.o., male.      Pre-op Assessment          Review of Systems  Cardiovascular:        CAD      Angina           Cardiovascular Symptoms: Angina       Coronary Artery Disease:                                  Hepatic/GI:      Liver Disease,         Liver Disease            Physical Exam  General: Well nourished        Anesthesia Plan  Type of Anesthesia, risks & benefits discussed:    Anesthesia Type: Gen Natural Airway, MAC  Intra-op Monitoring Plan: Standard ASA Monitors  Post Op Pain Control Plan: multimodal analgesia and IV/PO Opioids PRN  Induction:  IV  Informed Consent: Informed consent signed with the Patient and all parties understand the risks and agree with anesthesia plan.  All questions answered.   ASA Score: 3  Day of Surgery Review of History & Physical: H&P Update referred to the surgeon/provider.    Ready For Surgery From Anesthesia Perspective.     .

## 2024-01-22 LAB
FINAL PATHOLOGIC DIAGNOSIS: NORMAL
GROSS: NORMAL
Lab: NORMAL
MICROSCOPIC EXAM: NORMAL

## 2024-01-26 DIAGNOSIS — G89.18 POST-OP PAIN: Primary | ICD-10-CM

## 2024-01-26 PROCEDURE — 93010 ELECTROCARDIOGRAM REPORT: CPT | Mod: S$GLB,,, | Performed by: INTERNAL MEDICINE

## 2024-01-26 RX ORDER — HYDROCODONE BITARTRATE AND ACETAMINOPHEN 5; 325 MG/1; MG/1
1 TABLET ORAL EVERY 6 HOURS PRN
Qty: 30 TABLET | Refills: 0 | Status: SHIPPED | OUTPATIENT
Start: 2024-01-26 | End: 2024-05-20 | Stop reason: ALTCHOICE

## 2024-01-26 NOTE — TELEPHONE ENCOUNTER
----- Message from Paula Mead sent at 1/26/2024  8:10 AM CST -----  Regarding: refill  Contact: patient  Type:  RX Refill Request    Who Called:  patient   Refill or New Rx:  refill  RX Name and Strength:  HYDROcodone-acetaminophen (NORCO) 5-325 mg per tablet  How is the patient currently taking it? (ex. 1XDay):    Is this a 30 day or 90 day RX:    Preferred Pharmacy with phone number:    [facility] #[id number] - [place] - [address] [facility]  [address] [address] [address]  Phone: [phone] Fax: [phone]      Local or Mail Order:    Ordering Provider:    Best Call Back Number:  [phone]    Additional Information:  call once it has been sent thanks!

## 2024-01-27 PROBLEM — K91.89 POSTOPERATIVE HEMORRHAGE OF ANUS: Status: ACTIVE | Noted: 2024-01-27

## 2024-01-27 PROBLEM — M25.561 RIGHT KNEE PAIN: Status: ACTIVE | Noted: 2022-06-21

## 2024-01-27 PROBLEM — K62.5 POSTOPERATIVE HEMORRHAGE OF ANUS: Status: ACTIVE | Noted: 2024-01-27

## 2024-01-27 PROBLEM — K62.5 RECTAL BLEEDING: Status: ACTIVE | Noted: 2024-01-27

## 2024-02-01 ENCOUNTER — PATIENT MESSAGE (OUTPATIENT)
Dept: SURGERY | Facility: CLINIC | Age: 69
End: 2024-02-01
Payer: MEDICARE

## 2024-02-01 ENCOUNTER — TELEPHONE (OUTPATIENT)
Dept: SURGERY | Facility: CLINIC | Age: 69
End: 2024-02-01
Payer: MEDICARE

## 2024-02-01 NOTE — TELEPHONE ENCOUNTER
Shaun message sent to patient to call back to reschedule his appointment from 2/1/24 with Dr. Zee.  Hung

## 2024-02-05 NOTE — TELEPHONE ENCOUNTER
Patient called back and I scheduled him with Dr. Zee on Wednesday, 2/14/24 @ 4:30pm, but told him to arrive @ 4:15pm in [place].  Hung

## 2024-02-08 ENCOUNTER — TELEPHONE (OUTPATIENT)
Dept: SURGERY | Facility: CLINIC | Age: 69
End: 2024-02-08
Payer: MEDICARE

## 2024-02-08 NOTE — TELEPHONE ENCOUNTER
I called patient to inform him that due to a change in Dr. Zee schedule on Wednesday, 2/14/24 and that I'll have to reschedule his 4:30pm appointment.  Patient states that he's doing okay and will cancel the appointment.  He'll call if he has any problems.  I'll let Dr. Zee know.  Hung

## 2024-04-23 ENCOUNTER — TELEPHONE (OUTPATIENT)
Dept: FAMILY MEDICINE | Facility: CLINIC | Age: 69
End: 2024-04-23
Payer: MEDICARE

## 2024-04-23 DIAGNOSIS — N52.8 OTHER MALE ERECTILE DYSFUNCTION: Primary | ICD-10-CM

## 2024-04-23 NOTE — TELEPHONE ENCOUNTER
----- Message from Luisa Olvera sent at 4/23/2024  2:16 PM CDT -----  Regarding: Needs Medical Advice/RX  Contact: patient at [phone]  Type: Needs Medical Advice/RX  Who Called:  patient at [phone]    Pharmacy name and phone #:    [id number] - [place] - [address] [facility]  [address] [address] [place] [address]  Phone: [phone] Fax: [phone]      Additional Information: Patient is requesting a refill of sildenafil (not in chart). Please call and advise. Thank you

## 2024-04-24 RX ORDER — SILDENAFIL CITRATE 20 MG/1
20 TABLET ORAL 3 TIMES DAILY
Qty: 30 TABLET | Refills: 5 | Status: SHIPPED | OUTPATIENT
Start: 2024-04-24 | End: 2025-04-24

## 2024-04-29 PROBLEM — K91.89 POSTOPERATIVE HEMORRHAGE OF ANUS: Status: RESOLVED | Noted: 2024-01-27 | Resolved: 2024-04-29

## 2024-04-29 PROBLEM — K62.5 POSTOPERATIVE HEMORRHAGE OF ANUS: Status: RESOLVED | Noted: 2024-01-27 | Resolved: 2024-04-29

## 2024-04-29 PROBLEM — K62.5 RECTAL BLEEDING: Status: RESOLVED | Noted: 2024-01-27 | Resolved: 2024-04-29

## 2024-05-01 ENCOUNTER — DOCUMENTATION ONLY (OUTPATIENT)
Dept: FAMILY MEDICINE | Facility: CLINIC | Age: 69
End: 2024-05-01
Payer: MEDICARE

## 2024-05-14 ENCOUNTER — LAB VISIT (OUTPATIENT)
Dept: LAB | Facility: HOSPITAL | Age: 69
End: 2024-05-14
Attending: FAMILY MEDICINE
Payer: MEDICARE

## 2024-05-14 DIAGNOSIS — Z12.5 PROSTATE CANCER SCREENING: ICD-10-CM

## 2024-05-14 DIAGNOSIS — I10 HYPERTENSION, UNSPECIFIED TYPE: ICD-10-CM

## 2024-05-14 LAB
ALBUMIN SERPL BCP-MCNC: 3.7 G/DL (ref 3.5–5.2)
ALP SERPL-CCNC: 111 U/L (ref 55–135)
ALT SERPL W/O P-5'-P-CCNC: 15 U/L (ref 10–44)
ANION GAP SERPL CALC-SCNC: 9 MMOL/L (ref 8–16)
AST SERPL-CCNC: 19 U/L (ref 10–40)
BASOPHILS # BLD AUTO: 0.07 K/UL (ref 0–0.2)
BASOPHILS NFR BLD: 0.6 % (ref 0–1.9)
BILIRUB SERPL-MCNC: 0.7 MG/DL (ref 0.1–1)
BUN SERPL-MCNC: 16 MG/DL (ref 8–23)
CALCIUM SERPL-MCNC: 9.1 MG/DL (ref 8.7–10.5)
CHLORIDE SERPL-SCNC: 105 MMOL/L (ref 95–110)
CHOLEST SERPL-MCNC: 116 MG/DL (ref 120–199)
CHOLEST/HDLC SERPL: 3.5 {RATIO} (ref 2–5)
CO2 SERPL-SCNC: 23 MMOL/L (ref 23–29)
COMPLEXED PSA SERPL-MCNC: 4.9 NG/ML (ref 0–4)
CREAT SERPL-MCNC: 1 MG/DL (ref 0.5–1.4)
DIFFERENTIAL METHOD BLD: ABNORMAL
EOSINOPHIL # BLD AUTO: 0.5 K/UL (ref 0–0.5)
EOSINOPHIL NFR BLD: 4 % (ref 0–8)
ERYTHROCYTE [DISTWIDTH] IN BLOOD BY AUTOMATED COUNT: 20 % (ref 11.5–14.5)
EST. GFR  (NO RACE VARIABLE): >60 ML/MIN/1.73 M^2
GLUCOSE SERPL-MCNC: 106 MG/DL (ref 70–110)
HCT VFR BLD AUTO: 37.2 % (ref 40–54)
HDLC SERPL-MCNC: 33 MG/DL (ref 40–75)
HDLC SERPL: 28.4 % (ref 20–50)
HGB BLD-MCNC: 11.3 G/DL (ref 14–18)
IMM GRANULOCYTES # BLD AUTO: 0.03 K/UL (ref 0–0.04)
IMM GRANULOCYTES NFR BLD AUTO: 0.3 % (ref 0–0.5)
LDLC SERPL CALC-MCNC: 67.4 MG/DL (ref 63–159)
LYMPHOCYTES # BLD AUTO: 4 K/UL (ref 1–4.8)
LYMPHOCYTES NFR BLD: 34.1 % (ref 18–48)
MCH RBC QN AUTO: 21.8 PG (ref 27–31)
MCHC RBC AUTO-ENTMCNC: 30.4 G/DL (ref 32–36)
MCV RBC AUTO: 72 FL (ref 82–98)
MONOCYTES # BLD AUTO: 1.1 K/UL (ref 0.3–1)
MONOCYTES NFR BLD: 9.4 % (ref 4–15)
NEUTROPHILS # BLD AUTO: 6.1 K/UL (ref 1.8–7.7)
NEUTROPHILS NFR BLD: 51.6 % (ref 38–73)
NONHDLC SERPL-MCNC: 83 MG/DL
NRBC BLD-RTO: 0 /100 WBC
PLATELET # BLD AUTO: 405 K/UL (ref 150–450)
PMV BLD AUTO: 11 FL (ref 9.2–12.9)
POTASSIUM SERPL-SCNC: 4.5 MMOL/L (ref 3.5–5.1)
PROT SERPL-MCNC: 7.2 G/DL (ref 6–8.4)
RBC # BLD AUTO: 5.18 M/UL (ref 4.6–6.2)
SODIUM SERPL-SCNC: 137 MMOL/L (ref 136–145)
TRIGL SERPL-MCNC: 78 MG/DL (ref 30–150)
TSH SERPL DL<=0.005 MIU/L-ACNC: 3.56 UIU/ML (ref 0.4–4)
WBC # BLD AUTO: 11.86 K/UL (ref 3.9–12.7)

## 2024-05-14 PROCEDURE — 85025 COMPLETE CBC W/AUTO DIFF WBC: CPT | Performed by: FAMILY MEDICINE

## 2024-05-14 PROCEDURE — 84153 ASSAY OF PSA TOTAL: CPT | Performed by: FAMILY MEDICINE

## 2024-05-14 PROCEDURE — 80061 LIPID PANEL: CPT | Performed by: FAMILY MEDICINE

## 2024-05-14 PROCEDURE — 84443 ASSAY THYROID STIM HORMONE: CPT | Performed by: FAMILY MEDICINE

## 2024-05-14 PROCEDURE — 80053 COMPREHEN METABOLIC PANEL: CPT | Performed by: FAMILY MEDICINE

## 2024-05-14 PROCEDURE — 36415 COLL VENOUS BLD VENIPUNCTURE: CPT | Mod: PO | Performed by: FAMILY MEDICINE

## 2024-05-20 ENCOUNTER — OFFICE VISIT (OUTPATIENT)
Dept: FAMILY MEDICINE | Facility: CLINIC | Age: 69
End: 2024-05-20
Payer: MEDICARE

## 2024-05-20 VITALS
OXYGEN SATURATION: 99 % | HEART RATE: 89 BPM | WEIGHT: 188.25 LBS | HEIGHT: 67 IN | BODY MASS INDEX: 29.55 KG/M2 | RESPIRATION RATE: 18 BRPM | SYSTOLIC BLOOD PRESSURE: 112 MMHG | DIASTOLIC BLOOD PRESSURE: 68 MMHG

## 2024-05-20 DIAGNOSIS — F41.9 ANXIETY: ICD-10-CM

## 2024-05-20 DIAGNOSIS — I10 HYPERTENSION, UNSPECIFIED TYPE: ICD-10-CM

## 2024-05-20 DIAGNOSIS — D50.0 IRON DEFICIENCY ANEMIA DUE TO CHRONIC BLOOD LOSS: ICD-10-CM

## 2024-05-20 DIAGNOSIS — Z00.00 PREVENTATIVE HEALTH CARE: Primary | ICD-10-CM

## 2024-05-20 PROCEDURE — 99999 PR PBB SHADOW E&M-EST. PATIENT-LVL III: CPT | Mod: PBBFAC,,, | Performed by: FAMILY MEDICINE

## 2024-05-20 PROCEDURE — 1160F RVW MEDS BY RX/DR IN RCRD: CPT | Mod: CPTII,S$GLB,, | Performed by: FAMILY MEDICINE

## 2024-05-20 PROCEDURE — 99397 PER PM REEVAL EST PAT 65+ YR: CPT | Mod: S$GLB,,, | Performed by: FAMILY MEDICINE

## 2024-05-20 PROCEDURE — 3008F BODY MASS INDEX DOCD: CPT | Mod: CPTII,S$GLB,, | Performed by: FAMILY MEDICINE

## 2024-05-20 PROCEDURE — 3078F DIAST BP <80 MM HG: CPT | Mod: CPTII,S$GLB,, | Performed by: FAMILY MEDICINE

## 2024-05-20 PROCEDURE — 3074F SYST BP LT 130 MM HG: CPT | Mod: CPTII,S$GLB,, | Performed by: FAMILY MEDICINE

## 2024-05-20 PROCEDURE — 1126F AMNT PAIN NOTED NONE PRSNT: CPT | Mod: CPTII,S$GLB,, | Performed by: FAMILY MEDICINE

## 2024-05-20 PROCEDURE — 1101F PT FALLS ASSESS-DOCD LE1/YR: CPT | Mod: CPTII,S$GLB,, | Performed by: FAMILY MEDICINE

## 2024-05-20 PROCEDURE — 3288F FALL RISK ASSESSMENT DOCD: CPT | Mod: CPTII,S$GLB,, | Performed by: FAMILY MEDICINE

## 2024-05-20 PROCEDURE — 4010F ACE/ARB THERAPY RXD/TAKEN: CPT | Mod: CPTII,S$GLB,, | Performed by: FAMILY MEDICINE

## 2024-05-20 PROCEDURE — 1159F MED LIST DOCD IN RCRD: CPT | Mod: CPTII,S$GLB,, | Performed by: FAMILY MEDICINE

## 2024-05-20 RX ORDER — FERROUS SULFATE 325(65) MG
325 TABLET ORAL
Qty: 90 TABLET | Refills: 0 | Status: SHIPPED | OUTPATIENT
Start: 2024-05-20

## 2024-05-20 NOTE — PROGRESS NOTES
Chief Complaint   Patient presents with    Anxiety     6 month follow up       This is a 69-year-old white male who presents for annual exam and 6 month f/u.    Overall doing well    Some persistent fatigue since episode of bleeding hemorrhoids 4 months ago.    CAD s/p stents, previous MI - tolerating Lipitor 80mg, toprol, lisinopril 2.5mg, ASA 81mg; following with cardiology, Dr. Olson  Anxiety -  Tolerating Effexor XR 75mg daily.  Mood is controlled. He is taking Xanax 1mg 1.5 - 2 tabs daily.   Barretts - doing well on the Nexium 40 mg daily  Hearing loss - Wearing hearing aids  BPH - getting up once nightly      Past Medical History:   Diagnosis Date    Anxiety     Diaz esophagus     BPH (benign prostatic hyperplasia)     CAD (coronary artery disease)     Essential tremor     HEARING LOSS     hearing aids    Hepatic cyst     History of MI (myocardial infarction)     HLD (hyperlipidemia)     Shingles     right upper back     Past Surgical History:   Procedure Laterality Date    CORONARY STENT PLACEMENT      RCA, LAD    EXAMINATION UNDER ANESTHESIA N/A 1/27/2024    Procedure: Exam under anesthesia- rectum;  Surgeon: Moshe Rogers MD;  Location: [facility] OR;  Service: General;  Laterality: N/A;  rectal bleeding s/p hemorrhoidectomy    EXCISIONAL HEMORRHOIDECTOMY N/A 1/17/2024    Procedure: HEMORRHOIDECTOMY;  Surgeon: Kwame Zee MD;  Location: [facility] OR;  Service: General;  Laterality: N/A;  Internal/ external    LEFT HEART CATHETERIZATION Left 06/15/2018    Procedure: Left heart cath;  Surgeon: Gino Olson MD;  Location: [facility] CATH;  Service: Cardiology;  Laterality: Left;    NASAL SEPTUM SURGERY      PARTIAL KNEE ARTHROPLASTY Left     RETURN TO OPERATING ROOM, FOR MINOR POSTOPERATIVE HEMORRHAGE CONTROL  1/27/2024    Procedure: Oversew/control of hemorrhage;  Surgeon: Moshe Rogers MD;  Location: [facility] OR;  Service: General;;    TONSILLECTOMY, ADENOIDECTOMY, BILATERAL MYRINGOTOMY AND TUBES        Current Outpatient Medications on File Prior to Visit   Medication Sig Dispense Refill    ALPRAZolam (XANAX) 1 MG tablet Take 1 tablet (1 mg total) by mouth 2 (two) times daily. 60 tablet 5    aspirin (ECOTRIN) 81 MG EC tablet Take 1 tablet (81 mg total) by mouth once daily.  0    atorvastatin (LIPITOR) 80 MG tablet Take 1 tablet (80 mg total) by mouth every evening. 90 tablet 3    esomeprazole (NEXIUM) 40 MG capsule Take 1 capsule (40 mg total) by mouth once daily. 90 capsule 3    lisinopriL (PRINIVIL,ZESTRIL) 2.5 MG tablet Take 1 tablet (2.5 mg total) by mouth once daily. 90 tablet 3    metoprolol succinate (TOPROL-XL) 25 MG 24 hr tablet Take 1 tablet (25 mg total) by mouth once daily. 90 tablet 3    polyethylene glycol (GLYCOLAX) 17 gram PwPk Take 17 g by mouth once daily.  0    sildenafil (REVATIO) 20 mg Tab Take 1 tablet (20 mg total) by mouth 3 (three) times daily. 30 tablet 5    venlafaxine (EFFEXOR-XR) 75 MG 24 hr capsule Take 1 capsule (75 mg total) by mouth once daily. 90 capsule 3     Current Facility-Administered Medications on File Prior to Visit   Medication Dose Route Frequency Provider Last Rate Last Admin    lactated ringers infusion   Intravenous Continuous Margaret Abdul MD 10 mL/hr at 01/17/24 1022 New Bag at 01/27/24 0230    LIDOcaine (PF) 10 mg/ml (1%) injection 10 mg  1 mL Intradermal Once Margaret Abdul MD             FAMILY HISTORY:  Father: CABG at 67, prostate cancer  Mother:alzheimer's  3 sisters  1 brother    SOCIAL HISTORY:  Tobacco use: variable (1/2 PPD)  Alcohol use:weekly wine  Ilicit drug use:denies  Occupation:residential development,   Marital status:  Children:5    REVIEW OF SYSTEMS:  GENERAL: No fever, chills, fatigability or weight changes.  SKIN/BREASTS: No rashes, sores, itching or changes in color or texture of skin.  HEAD: No headaches or recent head trauma.   EYES: Visual acuity fine. Denies blurriness, tearing, itching, photophobia,  "diplopia, or visual changes.   EARS: Denies ear pain, discharge, tinnitus or vertigo.  NOSE: No loss of smell, epistaxis, postnasal drip, discharge, obstruction, or sneezing.  MOUTH & THROAT: No hoarseness, change in voice, swallowing difficulty. No excessive gum bleeding.   HEMATOLOGICAL/NODES: Denies swollen glands. No bleeding or bruising.  CHEST: No KOVACS, cyanosis, wheezing, cough or sputum production.  CARDIOVASCULAR: Denies dyspnea, orthopnea, or palpitations.  GI/ABDOMEN: Appetite fine. No weight loss. Denies nausea, vomiting, diarrhea, constipation, abdominal pain, hematemesis or blood in stool.  URINARY: No dysuria,hematuria, nocturia, incontinence, flank pain, urgency, or urinary difficulty.  PERIPHERAL VASCULAR: No claudication, cold intolerance or cyanosis.  MUSCULOSKELETAL: No joint stiffness, pain, swelling, or loss of strength. Denies back pain.  NEUROLOGIC: No history of seizures, paralysis, alteration of gait or coordination.      PHYSICAL EXAM:  /68   Pulse 89   Resp 18   Ht 5' 7" (1.702 m)   Wt 85.4 kg (188 lb 4.4 oz)   SpO2 99%   BMI 29.49 kg/m²     APPEARANCE: Well nourished, well developed, neatly groomed, in no acute distress.   SKIN: Warm, moist and dry. No lesions or rashes.  NECK: Supple with full range of motion. No masses. No thyromegaly. No JVD or bruits.  CHEST: CTA bilaterally  CV: S1S2 rrr no m/r/g  EXTREMITIES: No edema or cyanosis. Pedal pulses 2+ bilaterally. No varicosities   Gait & Posture: Normal gait and fine motion.  PSYCH: Awake, alert, oriented to person, place, time, and situation. Pleasant and cooperative mood with intact judgment.    Results for orders placed or performed in visit on 05/14/24   Comprehensive Metabolic Panel   Result Value Ref Range    Sodium 137 136 - 145 mmol/L    Potassium 4.5 3.5 - 5.1 mmol/L    Chloride 105 95 - 110 mmol/L    CO2 23 23 - 29 mmol/L    Glucose 106 70 - 110 mg/dL    BUN 16 8 - 23 mg/dL    Creatinine 1.0 0.5 - 1.4 mg/dL    " Calcium 9.1 8.7 - 10.5 mg/dL    Total Protein 7.2 6.0 - 8.4 g/dL    Albumin 3.7 3.5 - 5.2 g/dL    Total Bilirubin 0.7 0.1 - 1.0 mg/dL    Alkaline Phosphatase 111 55 - 135 U/L    AST 19 10 - 40 U/L    ALT 15 10 - 44 U/L    eGFR >60.0 >60 mL/min/1.73 m^2    Anion Gap 9 8 - 16 mmol/L   Lipid Panel   Result Value Ref Range    Cholesterol 116 (L) 120 - 199 mg/dL    Triglycerides 78 30 - 150 mg/dL    HDL 33 (L) 40 - 75 mg/dL    LDL Cholesterol 67.4 63.0 - 159.0 mg/dL    HDL/Cholesterol Ratio 28.4 20.0 - 50.0 %    Total Cholesterol/HDL Ratio 3.5 2.0 - 5.0    Non-HDL Cholesterol 83 mg/dL   TSH   Result Value Ref Range    TSH 3.561 0.400 - 4.000 uIU/mL   CBC Auto Differential   Result Value Ref Range    WBC 11.86 3.90 - 12.70 K/uL    RBC 5.18 4.60 - 6.20 M/uL    Hemoglobin 11.3 (L) 14.0 - 18.0 g/dL    Hematocrit 37.2 (L) 40.0 - 54.0 %    MCV 72 (L) 82 - 98 fL    MCH 21.8 (L) 27.0 - 31.0 pg    MCHC 30.4 (L) 32.0 - 36.0 g/dL    RDW 20.0 (H) 11.5 - 14.5 %    Platelets 405 150 - 450 K/uL    MPV 11.0 9.2 - 12.9 fL    Immature Granulocytes 0.3 0.0 - 0.5 %    Gran # (ANC) 6.1 1.8 - 7.7 K/uL    Immature Grans (Abs) 0.03 0.00 - 0.04 K/uL    Lymph # 4.0 1.0 - 4.8 K/uL    Mono # 1.1 (H) 0.3 - 1.0 K/uL    Eos # 0.5 0.0 - 0.5 K/uL    Baso # 0.07 0.00 - 0.20 K/uL    nRBC 0 0 /100 WBC    Gran % 51.6 38.0 - 73.0 %    Lymph % 34.1 18.0 - 48.0 %    Mono % 9.4 4.0 - 15.0 %    Eosinophil % 4.0 0.0 - 8.0 %    Basophil % 0.6 0.0 - 1.9 %    Differential Method Automated    PSA, Screening   Result Value Ref Range    PSA, Screen 4.9 (H) 0.00 - 4.00 ng/mL      reviewed    ASSESSMENT/PLAN:    Preventative health care    Iron deficiency anemia due to chronic blood loss  -     ferrous sulfate (IRON) 325 mg (65 mg iron) Tab tablet; Take 1 tablet (325 mg total) by mouth daily with breakfast.  Dispense: 90 tablet; Refill: 0  -     CBC Auto Differential; Future; Expected date: 08/20/2024  -     Comprehensive Metabolic Panel; Future; Expected date:  08/20/2024  -     IRON AND TIBC; Future; Expected date: 08/20/2024  -     FERRITIN; Future; Expected date: 08/20/2024    Anxiety    Hypertension, unspecified type      Begin iron daily  doing well  Counseled on regular exercise, maintenance of a healthy weight, balanced diet rich in fruits/vegetables and lean protein, and avoidance of unhealthy habits like smoking and excessive alcohol intake.  Xanax 1mg prn anxiety  Effexor 75mg daily  continue Nexium  Continue other present meds and annual cardiology follow-up    F/u 3 months with labs to recheck iron deficiency

## 2024-08-01 DIAGNOSIS — F41.1 GAD (GENERALIZED ANXIETY DISORDER): ICD-10-CM

## 2024-08-01 NOTE — TELEPHONE ENCOUNTER
No care due was identified.  Health [facility] Embedded Care Due Messages. Reference number: [id number].   8/01/2024 5:09:04 PM CDT

## 2024-08-02 RX ORDER — ALPRAZOLAM 1 MG/1
1 TABLET ORAL 2 TIMES DAILY
Qty: 60 TABLET | Refills: 5 | Status: SHIPPED | OUTPATIENT
Start: 2024-08-02

## 2024-08-13 ENCOUNTER — LAB VISIT (OUTPATIENT)
Dept: LAB | Facility: HOSPITAL | Age: 69
End: 2024-08-13
Attending: FAMILY MEDICINE
Payer: MEDICARE

## 2024-08-13 DIAGNOSIS — D50.0 IRON DEFICIENCY ANEMIA DUE TO CHRONIC BLOOD LOSS: ICD-10-CM

## 2024-08-13 LAB
ALBUMIN SERPL BCP-MCNC: 3.5 G/DL (ref 3.5–5.2)
ALP SERPL-CCNC: 100 U/L (ref 55–135)
ALT SERPL W/O P-5'-P-CCNC: 19 U/L (ref 10–44)
ANION GAP SERPL CALC-SCNC: 7 MMOL/L (ref 8–16)
AST SERPL-CCNC: 20 U/L (ref 10–40)
BASOPHILS # BLD AUTO: 0.04 K/UL (ref 0–0.2)
BASOPHILS NFR BLD: 0.4 % (ref 0–1.9)
BILIRUB SERPL-MCNC: 0.5 MG/DL (ref 0.1–1)
BUN SERPL-MCNC: 12 MG/DL (ref 8–23)
CALCIUM SERPL-MCNC: 8.8 MG/DL (ref 8.7–10.5)
CHLORIDE SERPL-SCNC: 106 MMOL/L (ref 95–110)
CO2 SERPL-SCNC: 24 MMOL/L (ref 23–29)
CREAT SERPL-MCNC: 1 MG/DL (ref 0.5–1.4)
DIFFERENTIAL METHOD BLD: ABNORMAL
EOSINOPHIL # BLD AUTO: 0.4 K/UL (ref 0–0.5)
EOSINOPHIL NFR BLD: 4.5 % (ref 0–8)
ERYTHROCYTE [DISTWIDTH] IN BLOOD BY AUTOMATED COUNT: 24.2 % (ref 11.5–14.5)
EST. GFR  (NO RACE VARIABLE): >60 ML/MIN/1.73 M^2
FERRITIN SERPL-MCNC: 29 NG/ML (ref 20–300)
GLUCOSE SERPL-MCNC: 110 MG/DL (ref 70–110)
HCT VFR BLD AUTO: 47.3 % (ref 40–54)
HGB BLD-MCNC: 15.3 G/DL (ref 14–18)
IMM GRANULOCYTES # BLD AUTO: 0.02 K/UL (ref 0–0.04)
IMM GRANULOCYTES NFR BLD AUTO: 0.2 % (ref 0–0.5)
IRON SERPL-MCNC: 48 UG/DL (ref 45–160)
LYMPHOCYTES # BLD AUTO: 4 K/UL (ref 1–4.8)
LYMPHOCYTES NFR BLD: 42.2 % (ref 18–48)
MCH RBC QN AUTO: 26.8 PG (ref 27–31)
MCHC RBC AUTO-ENTMCNC: 32.3 G/DL (ref 32–36)
MCV RBC AUTO: 83 FL (ref 82–98)
MONOCYTES # BLD AUTO: 0.8 K/UL (ref 0.3–1)
MONOCYTES NFR BLD: 8.2 % (ref 4–15)
NEUTROPHILS # BLD AUTO: 4.3 K/UL (ref 1.8–7.7)
NEUTROPHILS NFR BLD: 44.5 % (ref 38–73)
NRBC BLD-RTO: 0 /100 WBC
PLATELET # BLD AUTO: 242 K/UL (ref 150–450)
PMV BLD AUTO: 10.9 FL (ref 9.2–12.9)
POTASSIUM SERPL-SCNC: 4.3 MMOL/L (ref 3.5–5.1)
PROT SERPL-MCNC: 6.7 G/DL (ref 6–8.4)
RBC # BLD AUTO: 5.7 M/UL (ref 4.6–6.2)
SATURATED IRON: 12 % (ref 20–50)
SODIUM SERPL-SCNC: 137 MMOL/L (ref 136–145)
TOTAL IRON BINDING CAPACITY: 392 UG/DL (ref 250–450)
TRANSFERRIN SERPL-MCNC: 265 MG/DL (ref 200–375)
WBC # BLD AUTO: 9.58 K/UL (ref 3.9–12.7)

## 2024-08-13 PROCEDURE — 82728 ASSAY OF FERRITIN: CPT | Performed by: FAMILY MEDICINE

## 2024-08-13 PROCEDURE — 36415 COLL VENOUS BLD VENIPUNCTURE: CPT | Mod: PO | Performed by: FAMILY MEDICINE

## 2024-08-13 PROCEDURE — 80053 COMPREHEN METABOLIC PANEL: CPT | Performed by: FAMILY MEDICINE

## 2024-08-13 PROCEDURE — 83540 ASSAY OF IRON: CPT | Performed by: FAMILY MEDICINE

## 2024-08-13 PROCEDURE — 85025 COMPLETE CBC W/AUTO DIFF WBC: CPT | Performed by: FAMILY MEDICINE

## 2024-08-15 DIAGNOSIS — D50.0 IRON DEFICIENCY ANEMIA DUE TO CHRONIC BLOOD LOSS: ICD-10-CM

## 2024-08-15 RX ORDER — FERROUS SULFATE 325(65) MG
325 TABLET ORAL DAILY
Qty: 90 TABLET | Refills: 0 | Status: SHIPPED | OUTPATIENT
Start: 2024-08-15

## 2024-08-20 ENCOUNTER — OFFICE VISIT (OUTPATIENT)
Dept: FAMILY MEDICINE | Facility: CLINIC | Age: 69
End: 2024-08-20
Payer: MEDICARE

## 2024-08-20 VITALS
RESPIRATION RATE: 18 BRPM | OXYGEN SATURATION: 98 % | HEART RATE: 78 BPM | HEIGHT: 67 IN | SYSTOLIC BLOOD PRESSURE: 124 MMHG | BODY MASS INDEX: 29.9 KG/M2 | WEIGHT: 190.5 LBS | DIASTOLIC BLOOD PRESSURE: 68 MMHG

## 2024-08-20 DIAGNOSIS — D50.0 IRON DEFICIENCY ANEMIA DUE TO CHRONIC BLOOD LOSS: Primary | ICD-10-CM

## 2024-08-20 DIAGNOSIS — I10 HYPERTENSION, UNSPECIFIED TYPE: ICD-10-CM

## 2024-08-20 PROCEDURE — 1160F RVW MEDS BY RX/DR IN RCRD: CPT | Mod: CPTII,S$GLB,, | Performed by: FAMILY MEDICINE

## 2024-08-20 PROCEDURE — 1101F PT FALLS ASSESS-DOCD LE1/YR: CPT | Mod: CPTII,S$GLB,, | Performed by: FAMILY MEDICINE

## 2024-08-20 PROCEDURE — 3074F SYST BP LT 130 MM HG: CPT | Mod: CPTII,S$GLB,, | Performed by: FAMILY MEDICINE

## 2024-08-20 PROCEDURE — 3008F BODY MASS INDEX DOCD: CPT | Mod: CPTII,S$GLB,, | Performed by: FAMILY MEDICINE

## 2024-08-20 PROCEDURE — 99999 PR PBB SHADOW E&M-EST. PATIENT-LVL III: CPT | Mod: PBBFAC,,, | Performed by: FAMILY MEDICINE

## 2024-08-20 PROCEDURE — 1126F AMNT PAIN NOTED NONE PRSNT: CPT | Mod: CPTII,S$GLB,, | Performed by: FAMILY MEDICINE

## 2024-08-20 PROCEDURE — 3288F FALL RISK ASSESSMENT DOCD: CPT | Mod: CPTII,S$GLB,, | Performed by: FAMILY MEDICINE

## 2024-08-20 PROCEDURE — 4010F ACE/ARB THERAPY RXD/TAKEN: CPT | Mod: CPTII,S$GLB,, | Performed by: FAMILY MEDICINE

## 2024-08-20 PROCEDURE — 99214 OFFICE O/P EST MOD 30 MIN: CPT | Mod: S$GLB,,, | Performed by: FAMILY MEDICINE

## 2024-08-20 PROCEDURE — 1159F MED LIST DOCD IN RCRD: CPT | Mod: CPTII,S$GLB,, | Performed by: FAMILY MEDICINE

## 2024-08-20 PROCEDURE — 3078F DIAST BP <80 MM HG: CPT | Mod: CPTII,S$GLB,, | Performed by: FAMILY MEDICINE

## 2024-08-20 NOTE — PROGRESS NOTES
Chief Complaint   Patient presents with    Anemia     3 month follow up with labs       This is a 69-year-old white male who presents for 3 month f/u.    Overall doing well    Not further bleeding hemorrhoids    Iron deficiency - tolerating iron for the last 3 months  CAD s/p stents, previous MI - tolerating Lipitor 80mg, toprol, lisinopril 2.5mg, ASA 81mg; following with cardiology, Dr. Olson  Anxiety -  Tolerating Effexor XR 75mg daily.  Mood is controlled. He is taking Xanax 1mg 1.5 - 2 tabs daily.   Barretts - doing well on the Nexium 40 mg daily  Hearing loss - Wearing hearing aids  BPH - getting up once nightly      Past Medical History:   Diagnosis Date    Anxiety     Diaz esophagus     BPH (benign prostatic hyperplasia)     CAD (coronary artery disease)     Essential tremor     HEARING LOSS     hearing aids    Hepatic cyst     History of MI (myocardial infarction)     HLD (hyperlipidemia)     Shingles     right upper back     Past Surgical History:   Procedure Laterality Date    CORONARY STENT PLACEMENT      RCA, LAD    EXAMINATION UNDER ANESTHESIA N/A 1/27/2024    Procedure: Exam under anesthesia- rectum;  Surgeon: Moshe Rogers MD;  Location: [facility] OR;  Service: General;  Laterality: N/A;  rectal bleeding s/p hemorrhoidectomy    EXCISIONAL HEMORRHOIDECTOMY N/A 1/17/2024    Procedure: HEMORRHOIDECTOMY;  Surgeon: Kwame Zee MD;  Location: [facility] OR;  Service: General;  Laterality: N/A;  Internal/ external    LEFT HEART CATHETERIZATION Left 06/15/2018    Procedure: Left heart cath;  Surgeon: Gino Olson MD;  Location: [facility] CATH;  Service: Cardiology;  Laterality: Left;    NASAL SEPTUM SURGERY      PARTIAL KNEE ARTHROPLASTY Left     RETURN TO OPERATING ROOM, FOR MINOR POSTOPERATIVE HEMORRHAGE CONTROL  1/27/2024    Procedure: Oversew/control of hemorrhage;  Surgeon: Moshe Rogers MD;  Location: [facility] OR;  Service: General;;    TONSILLECTOMY, ADENOIDECTOMY, BILATERAL MYRINGOTOMY AND TUBES        Current Outpatient Medications on File Prior to Visit   Medication Sig Dispense Refill    ALPRAZolam (XANAX) 1 MG tablet Take 1 tablet (1 mg total) by mouth 2 (two) times daily. 60 tablet 5    aspirin (ECOTRIN) 81 MG EC tablet Take 1 tablet (81 mg total) by mouth once daily.  0    atorvastatin (LIPITOR) 80 MG tablet Take 1 tablet (80 mg total) by mouth every evening. 90 tablet 3    esomeprazole (NEXIUM) 40 MG capsule Take 1 capsule (40 mg total) by mouth once daily. 90 capsule 3    ferrous sulfate (FEOSOL) 325 mg (65 mg iron) Tab tablet Take 1 tablet (325 mg total) by mouth once daily. 90 tablet 0    lisinopriL (PRINIVIL,ZESTRIL) 2.5 MG tablet Take 1 tablet (2.5 mg total) by mouth once daily. 90 tablet 3    metoprolol succinate (TOPROL-XL) 25 MG 24 hr tablet Take 1 tablet (25 mg total) by mouth once daily. 90 tablet 3    polyethylene glycol (GLYCOLAX) 17 gram PwPk Take 17 g by mouth once daily.  0    sildenafil (REVATIO) 20 mg Tab Take 1 tablet (20 mg total) by mouth 3 (three) times daily. 30 tablet 5    venlafaxine (EFFEXOR-XR) 75 MG 24 hr capsule Take 1 capsule (75 mg total) by mouth once daily. 90 capsule 3     Current Facility-Administered Medications on File Prior to Visit   Medication Dose Route Frequency Provider Last Rate Last Admin    lactated ringers infusion   Intravenous Continuous Margaret Abdul MD 10 mL/hr at 01/17/24 1022 New Bag at 01/27/24 0230    LIDOcaine (PF) 10 mg/ml (1%) injection 10 mg  1 mL Intradermal Once Margaret Abdul MD             FAMILY HISTORY:  Father: CABG at 67, prostate cancer  Mother:alzheimer's  3 sisters  1 brother    SOCIAL HISTORY:  Tobacco use: variable (1/2 PPD)  Alcohol use:weekly wine  Ilicit drug use:denies  Occupation:residential development,   Marital status:  Children:5    REVIEW OF SYSTEMS:  GENERAL: No fever, chills, fatigability or weight changes.  SKIN/BREASTS: No rashes, sores, itching or changes in color or texture of  "skin.  HEAD: No headaches or recent head trauma.   EYES: Visual acuity fine. Denies blurriness, tearing, itching, photophobia, diplopia, or visual changes.   EARS: Denies ear pain, discharge, tinnitus or vertigo.  NOSE: No loss of smell, epistaxis, postnasal drip, discharge, obstruction, or sneezing.  MOUTH & THROAT: No hoarseness, change in voice, swallowing difficulty. No excessive gum bleeding.   HEMATOLOGICAL/NODES: Denies swollen glands. No bleeding or bruising.  CHEST: No KOVACS, cyanosis, wheezing, cough or sputum production.  CARDIOVASCULAR: Denies dyspnea, orthopnea, or palpitations.  GI/ABDOMEN: Appetite fine. No weight loss. Denies nausea, vomiting, diarrhea, constipation, abdominal pain, hematemesis or blood in stool.  URINARY: No dysuria,hematuria, nocturia, incontinence, flank pain, urgency, or urinary difficulty.  PERIPHERAL VASCULAR: No claudication, cold intolerance or cyanosis.  MUSCULOSKELETAL: No joint stiffness, pain, swelling, or loss of strength. Denies back pain.  NEUROLOGIC: No history of seizures, paralysis, alteration of gait or coordination.      PHYSICAL EXAM:  /68   Pulse 78   Resp 18   Ht 5' 7" (1.702 m)   Wt 86.4 kg (190 lb 7.6 oz)   SpO2 98%   BMI 29.83 kg/m²     APPEARANCE: Well nourished, well developed, neatly groomed, in no acute distress.   SKIN: Warm, moist and dry. No lesions or rashes.  NECK: Supple with full range of motion. No masses. No thyromegaly. No JVD or bruits.  CHEST: CTA bilaterally  CV: S1S2 rrr no m/r/g  EXTREMITIES: No edema or cyanosis. Pedal pulses 2+ bilaterally. No varicosities   Gait & Posture: Normal gait and fine motion.  PSYCH: Awake, alert, oriented to person, place, time, and situation. Pleasant and cooperative mood with intact judgment.    Results for orders placed or performed in visit on 08/13/24   CBC Auto Differential   Result Value Ref Range    WBC 9.58 3.90 - 12.70 K/uL    RBC 5.70 4.60 - 6.20 M/uL    Hemoglobin 15.3 14.0 - 18.0 g/dL    " Hematocrit 47.3 40.0 - 54.0 %    MCV 83 82 - 98 fL    MCH 26.8 (L) 27.0 - 31.0 pg    MCHC 32.3 32.0 - 36.0 g/dL    RDW 24.2 (H) 11.5 - 14.5 %    Platelets 242 150 - 450 K/uL    MPV 10.9 9.2 - 12.9 fL    Immature Granulocytes 0.2 0.0 - 0.5 %    Gran # (ANC) 4.3 1.8 - 7.7 K/uL    Immature Grans (Abs) 0.02 0.00 - 0.04 K/uL    Lymph # 4.0 1.0 - 4.8 K/uL    Mono # 0.8 0.3 - 1.0 K/uL    Eos # 0.4 0.0 - 0.5 K/uL    Baso # 0.04 0.00 - 0.20 K/uL    nRBC 0 0 /100 WBC    Gran % 44.5 38.0 - 73.0 %    Lymph % 42.2 18.0 - 48.0 %    Mono % 8.2 4.0 - 15.0 %    Eosinophil % 4.5 0.0 - 8.0 %    Basophil % 0.4 0.0 - 1.9 %    Differential Method Automated    Comprehensive Metabolic Panel   Result Value Ref Range    Sodium 137 136 - 145 mmol/L    Potassium 4.3 3.5 - 5.1 mmol/L    Chloride 106 95 - 110 mmol/L    CO2 24 23 - 29 mmol/L    Glucose 110 70 - 110 mg/dL    BUN 12 8 - 23 mg/dL    Creatinine 1.0 0.5 - 1.4 mg/dL    Calcium 8.8 8.7 - 10.5 mg/dL    Total Protein 6.7 6.0 - 8.4 g/dL    Albumin 3.5 3.5 - 5.2 g/dL    Total Bilirubin 0.5 0.1 - 1.0 mg/dL    Alkaline Phosphatase 100 55 - 135 U/L    AST 20 10 - 40 U/L    ALT 19 10 - 44 U/L    eGFR >60.0 >60 mL/min/1.73 m^2    Anion Gap 7 (L) 8 - 16 mmol/L   IRON AND TIBC   Result Value Ref Range    Iron 48 45 - 160 ug/dL    Transferrin 265 200 - 375 mg/dL    TIBC 392 250 - 450 ug/dL    Saturated Iron 12 (L) 20 - 50 %   FERRITIN   Result Value Ref Range    Ferritin 29 20.0 - 300.0 ng/mL      reviewed    ASSESSMENT/PLAN:    Iron deficiency anemia due to chronic blood loss  -     CBC Auto Differential; Future; Expected date: 11/20/2024  -     IRON AND TIBC; Future; Expected date: 11/20/2024  -     FERRITIN; Future; Expected date: 11/20/2024    Hypertension, unspecified type        Iron deficiency improved  Continue iron daily to improve iron stores  doing well  Counseled on regular exercise, maintenance of a healthy weight, balanced diet rich in fruits/vegetables and lean protein, and  avoidance of unhealthy habits like smoking and excessive alcohol intake.  Xanax 1mg prn anxiety  Effexor 75mg daily  continue Nexium  Continue other present meds and annual cardiology follow-up    F/u 3 months with labs to recheck iron deficiency

## 2024-11-12 ENCOUNTER — LAB VISIT (OUTPATIENT)
Dept: LAB | Facility: HOSPITAL | Age: 69
End: 2024-11-12
Payer: MEDICARE

## 2024-11-12 DIAGNOSIS — D50.0 IRON DEFICIENCY ANEMIA DUE TO CHRONIC BLOOD LOSS: ICD-10-CM

## 2024-11-12 LAB
BASOPHILS # BLD AUTO: 0.06 K/UL (ref 0–0.2)
BASOPHILS NFR BLD: 0.6 % (ref 0–1.9)
DIFFERENTIAL METHOD BLD: ABNORMAL
EOSINOPHIL # BLD AUTO: 0.4 K/UL (ref 0–0.5)
EOSINOPHIL NFR BLD: 3.5 % (ref 0–8)
ERYTHROCYTE [DISTWIDTH] IN BLOOD BY AUTOMATED COUNT: 14.9 % (ref 11.5–14.5)
FERRITIN SERPL-MCNC: 71 NG/ML (ref 20–300)
HCT VFR BLD AUTO: 52.7 % (ref 40–54)
HGB BLD-MCNC: 17.1 G/DL (ref 14–18)
IMM GRANULOCYTES # BLD AUTO: 0.03 K/UL (ref 0–0.04)
IMM GRANULOCYTES NFR BLD AUTO: 0.3 % (ref 0–0.5)
IRON SERPL-MCNC: 94 UG/DL (ref 45–160)
LYMPHOCYTES # BLD AUTO: 4.1 K/UL (ref 1–4.8)
LYMPHOCYTES NFR BLD: 40.1 % (ref 18–48)
MCH RBC QN AUTO: 30.5 PG (ref 27–31)
MCHC RBC AUTO-ENTMCNC: 32.4 G/DL (ref 32–36)
MCV RBC AUTO: 94 FL (ref 82–98)
MONOCYTES # BLD AUTO: 0.8 K/UL (ref 0.3–1)
MONOCYTES NFR BLD: 7.6 % (ref 4–15)
NEUTROPHILS # BLD AUTO: 4.8 K/UL (ref 1.8–7.7)
NEUTROPHILS NFR BLD: 47.9 % (ref 38–73)
NRBC BLD-RTO: 0 /100 WBC
PLATELET # BLD AUTO: 264 K/UL (ref 150–450)
PMV BLD AUTO: 11.4 FL (ref 9.2–12.9)
RBC # BLD AUTO: 5.61 M/UL (ref 4.6–6.2)
SATURATED IRON: 24 % (ref 20–50)
TOTAL IRON BINDING CAPACITY: 385 UG/DL (ref 250–450)
TRANSFERRIN SERPL-MCNC: 260 MG/DL (ref 200–375)
WBC # BLD AUTO: 10.09 K/UL (ref 3.9–12.7)

## 2024-11-12 PROCEDURE — 82728 ASSAY OF FERRITIN: CPT | Performed by: FAMILY MEDICINE

## 2024-11-12 PROCEDURE — 84466 ASSAY OF TRANSFERRIN: CPT | Performed by: FAMILY MEDICINE

## 2024-11-12 PROCEDURE — 85025 COMPLETE CBC W/AUTO DIFF WBC: CPT | Performed by: FAMILY MEDICINE

## 2024-11-12 PROCEDURE — 36415 COLL VENOUS BLD VENIPUNCTURE: CPT | Mod: PO | Performed by: FAMILY MEDICINE

## 2024-11-20 ENCOUNTER — OFFICE VISIT (OUTPATIENT)
Dept: FAMILY MEDICINE | Facility: CLINIC | Age: 69
End: 2024-11-20
Payer: MEDICARE

## 2024-11-20 VITALS
WEIGHT: 190.94 LBS | HEIGHT: 67 IN | SYSTOLIC BLOOD PRESSURE: 118 MMHG | BODY MASS INDEX: 29.97 KG/M2 | RESPIRATION RATE: 18 BRPM | HEART RATE: 75 BPM | DIASTOLIC BLOOD PRESSURE: 64 MMHG | OXYGEN SATURATION: 99 %

## 2024-11-20 DIAGNOSIS — Z87.891 PERSONAL HISTORY OF NICOTINE DEPENDENCE: ICD-10-CM

## 2024-11-20 DIAGNOSIS — D50.0 IRON DEFICIENCY ANEMIA DUE TO CHRONIC BLOOD LOSS: Primary | ICD-10-CM

## 2024-11-20 DIAGNOSIS — Z00.00 PREVENTATIVE HEALTH CARE: ICD-10-CM

## 2024-11-20 DIAGNOSIS — I10 HYPERTENSION, UNSPECIFIED TYPE: ICD-10-CM

## 2024-11-20 DIAGNOSIS — Z12.5 PROSTATE CANCER SCREENING: ICD-10-CM

## 2024-11-20 DIAGNOSIS — Z72.0 TOBACCO ABUSE: ICD-10-CM

## 2024-11-20 PROCEDURE — 99214 OFFICE O/P EST MOD 30 MIN: CPT | Mod: S$GLB,,, | Performed by: FAMILY MEDICINE

## 2024-11-20 PROCEDURE — 1101F PT FALLS ASSESS-DOCD LE1/YR: CPT | Mod: CPTII,S$GLB,, | Performed by: FAMILY MEDICINE

## 2024-11-20 PROCEDURE — 1160F RVW MEDS BY RX/DR IN RCRD: CPT | Mod: CPTII,S$GLB,, | Performed by: FAMILY MEDICINE

## 2024-11-20 PROCEDURE — G0008 ADMIN INFLUENZA VIRUS VAC: HCPCS | Mod: S$GLB,,, | Performed by: FAMILY MEDICINE

## 2024-11-20 PROCEDURE — 4010F ACE/ARB THERAPY RXD/TAKEN: CPT | Mod: CPTII,S$GLB,, | Performed by: FAMILY MEDICINE

## 2024-11-20 PROCEDURE — 1159F MED LIST DOCD IN RCRD: CPT | Mod: CPTII,S$GLB,, | Performed by: FAMILY MEDICINE

## 2024-11-20 PROCEDURE — 1126F AMNT PAIN NOTED NONE PRSNT: CPT | Mod: CPTII,S$GLB,, | Performed by: FAMILY MEDICINE

## 2024-11-20 PROCEDURE — 99999 PR PBB SHADOW E&M-EST. PATIENT-LVL III: CPT | Mod: PBBFAC,,, | Performed by: FAMILY MEDICINE

## 2024-11-20 PROCEDURE — 90653 IIV ADJUVANT VACCINE IM: CPT | Mod: S$GLB,,, | Performed by: FAMILY MEDICINE

## 2024-11-20 PROCEDURE — 3288F FALL RISK ASSESSMENT DOCD: CPT | Mod: CPTII,S$GLB,, | Performed by: FAMILY MEDICINE

## 2024-11-20 PROCEDURE — 3074F SYST BP LT 130 MM HG: CPT | Mod: CPTII,S$GLB,, | Performed by: FAMILY MEDICINE

## 2024-11-20 PROCEDURE — 3008F BODY MASS INDEX DOCD: CPT | Mod: CPTII,S$GLB,, | Performed by: FAMILY MEDICINE

## 2024-11-20 PROCEDURE — 3078F DIAST BP <80 MM HG: CPT | Mod: CPTII,S$GLB,, | Performed by: FAMILY MEDICINE

## 2024-11-20 PROCEDURE — G2211 COMPLEX E/M VISIT ADD ON: HCPCS | Mod: S$GLB,,, | Performed by: FAMILY MEDICINE

## 2024-11-20 NOTE — PROGRESS NOTES
Chief Complaint   Patient presents with    Anemia     3 month follow up        This is a 69-year-old white male who presents for 3 month f/u.    Overall doing well    Iron deficiency - tolerating iron for the last 6 months  CAD s/p stents, previous MI - tolerating Lipitor 80mg, toprol, lisinopril 2.5mg, ASA 81mg; following with cardiology, Dr. Olson  Anxiety -  Tolerating Effexor XR 75mg daily.  Mood is controlled. He is taking Xanax 1mg 1.5 - 2 tabs daily.   Barretts - doing well on the Nexium 40 mg daily  Hearing loss - Wearing hearing aids  BPH - getting up once nightly      Past Medical History:   Diagnosis Date    Anxiety     Diaz esophagus     BPH (benign prostatic hyperplasia)     CAD (coronary artery disease)     Essential tremor     HEARING LOSS     hearing aids    Hepatic cyst     History of MI (myocardial infarction)     HLD (hyperlipidemia)     Shingles     right upper back     Past Surgical History:   Procedure Laterality Date    CORONARY STENT PLACEMENT      RCA, LAD    EXAMINATION UNDER ANESTHESIA N/A 1/27/2024    Procedure: Exam under anesthesia- rectum;  Surgeon: Moshe Rogers MD;  Location: [facility] OR;  Service: General;  Laterality: N/A;  rectal bleeding s/p hemorrhoidectomy    EXCISIONAL HEMORRHOIDECTOMY N/A 1/17/2024    Procedure: HEMORRHOIDECTOMY;  Surgeon: Kwame Zee MD;  Location: [facility] OR;  Service: General;  Laterality: N/A;  Internal/ external    LEFT HEART CATHETERIZATION Left 06/15/2018    Procedure: Left heart cath;  Surgeon: Gino Olson MD;  Location: [facility] CATH;  Service: Cardiology;  Laterality: Left;    NASAL SEPTUM SURGERY      PARTIAL KNEE ARTHROPLASTY Left     RETURN TO OPERATING ROOM, FOR MINOR POSTOPERATIVE HEMORRHAGE CONTROL  1/27/2024    Procedure: Oversew/control of hemorrhage;  Surgeon: Moshe Rogers MD;  Location: [facility] OR;  Service: General;;    TONSILLECTOMY, ADENOIDECTOMY, BILATERAL MYRINGOTOMY AND TUBES       Current Outpatient Medications on File Prior  to Visit   Medication Sig Dispense Refill    ALPRAZolam (XANAX) 1 MG tablet Take 1 tablet (1 mg total) by mouth 2 (two) times daily. 60 tablet 5    aspirin (ECOTRIN) 81 MG EC tablet Take 1 tablet (81 mg total) by mouth once daily.  0    atorvastatin (LIPITOR) 80 MG tablet Take 1 tablet (80 mg total) by mouth every evening. 90 tablet 3    esomeprazole (NEXIUM) 40 MG capsule Take 1 capsule (40 mg total) by mouth once daily. 90 capsule 3    ferrous sulfate (FEOSOL) 325 mg (65 mg iron) Tab tablet Take 1 tablet (325 mg total) by mouth once daily. 90 tablet 0    lisinopriL (PRINIVIL,ZESTRIL) 2.5 MG tablet Take 1 tablet (2.5 mg total) by mouth once daily. 90 tablet 3    metoprolol succinate (TOPROL-XL) 25 MG 24 hr tablet Take 1 tablet (25 mg total) by mouth once daily. 90 tablet 3    sildenafil (REVATIO) 20 mg Tab Take 1 tablet (20 mg total) by mouth 3 (three) times daily. 30 tablet 5    venlafaxine (EFFEXOR-XR) 75 MG 24 hr capsule Take 1 capsule (75 mg total) by mouth once daily. 90 capsule 3     Current Facility-Administered Medications on File Prior to Visit   Medication Dose Route Frequency Provider Last Rate Last Admin    lactated ringers infusion   Intravenous Continuous Margaret Abdul MD 10 mL/hr at 01/17/24 1022 New Bag at 01/27/24 0230    LIDOcaine (PF) 10 mg/ml (1%) injection 10 mg  1 mL Intradermal Once Margaret Abdul MD             FAMILY HISTORY:  Father: CABG at 67, prostate cancer  Mother:alzheimer's  3 sisters  1 brother    SOCIAL HISTORY:  Tobacco use: variable (1/2 PPD)  Alcohol use:weekly wine  Ilicit drug use:denies  Occupation:residential development,   Marital status:  Children:5    REVIEW OF SYSTEMS:  GENERAL: No fever, chills, fatigability or weight changes.  SKIN/BREASTS: No rashes, sores, itching or changes in color or texture of skin.  HEAD: No headaches or recent head trauma.   EYES: Visual acuity fine. Denies blurriness, tearing, itching, photophobia, diplopia, or  "visual changes.   EARS: Denies ear pain, discharge, tinnitus or vertigo.  NOSE: No loss of smell, epistaxis, postnasal drip, discharge, obstruction, or sneezing.  MOUTH & THROAT: No hoarseness, change in voice, swallowing difficulty. No excessive gum bleeding.   HEMATOLOGICAL/NODES: Denies swollen glands. No bleeding or bruising.  CHEST: No KOVACS, cyanosis, wheezing, cough or sputum production.  CARDIOVASCULAR: Denies dyspnea, orthopnea, or palpitations.  GI/ABDOMEN: Appetite fine. No weight loss. Denies nausea, vomiting, diarrhea, constipation, abdominal pain, hematemesis or blood in stool.  URINARY: No dysuria,hematuria, nocturia, incontinence, flank pain, urgency, or urinary difficulty.  PERIPHERAL VASCULAR: No claudication, cold intolerance or cyanosis.  MUSCULOSKELETAL: No joint stiffness, pain, swelling, or loss of strength. Denies back pain.  NEUROLOGIC: No history of seizures, paralysis, alteration of gait or coordination.      PHYSICAL EXAM:  /64   Pulse 75   Resp 18   Ht 5' 7" (1.702 m)   Wt 86.6 kg (190 lb 14.7 oz)   SpO2 99%   BMI 29.90 kg/m²     APPEARANCE: Well nourished, well developed, neatly groomed, in no acute distress.   SKIN: Warm, moist and dry. No lesions or rashes.  NECK: Supple with full range of motion. No masses. No thyromegaly. No JVD or bruits.  CHEST: CTA bilaterally  CV: S1S2 rrr no m/r/g  EXTREMITIES: No edema or cyanosis. Pedal pulses 2+ bilaterally. No varicosities   Gait & Posture: Normal gait and fine motion.  PSYCH: Awake, alert, oriented to person, place, time, and situation. Pleasant and cooperative mood with intact judgment.    Results for orders placed or performed in visit on 11/12/24   CBC Auto Differential    Collection Time: 11/12/24 12:35 PM   Result Value Ref Range    WBC 10.09 3.90 - 12.70 K/uL    RBC 5.61 4.60 - 6.20 M/uL    Hemoglobin 17.1 14.0 - 18.0 g/dL    Hematocrit 52.7 40.0 - 54.0 %    MCV 94 82 - 98 fL    MCH 30.5 27.0 - 31.0 pg    MCHC 32.4 32.0 - " 36.0 g/dL    RDW 14.9 (H) 11.5 - 14.5 %    Platelets 264 150 - 450 K/uL    MPV 11.4 9.2 - 12.9 fL    Immature Granulocytes 0.3 0.0 - 0.5 %    Gran # (ANC) 4.8 1.8 - 7.7 K/uL    Immature Grans (Abs) 0.03 0.00 - 0.04 K/uL    Lymph # 4.1 1.0 - 4.8 K/uL    Mono # 0.8 0.3 - 1.0 K/uL    Eos # 0.4 0.0 - 0.5 K/uL    Baso # 0.06 0.00 - 0.20 K/uL    nRBC 0 0 /100 WBC    Gran % 47.9 38.0 - 73.0 %    Lymph % 40.1 18.0 - 48.0 %    Mono % 7.6 4.0 - 15.0 %    Eosinophil % 3.5 0.0 - 8.0 %    Basophil % 0.6 0.0 - 1.9 %    Differential Method Automated    IRON AND TIBC    Collection Time: 11/12/24 12:35 PM   Result Value Ref Range    Iron 94 45 - 160 ug/dL    Transferrin 260 200 - 375 mg/dL    TIBC 385 250 - 450 ug/dL    Saturated Iron 24 20 - 50 %   FERRITIN    Collection Time: 11/12/24 12:35 PM   Result Value Ref Range    Ferritin 71 20.0 - 300.0 ng/mL      reviewed    ASSESSMENT/PLAN:    Iron deficiency anemia due to chronic blood loss    Prostate cancer screening  -     PSA, Screening; Future; Expected date: 05/19/2025    Hypertension, unspecified type  -     CBC Auto Differential; Future; Expected date: 05/19/2025  -     TSH; Future; Expected date: 05/19/2025  -     Lipid Panel; Future; Expected date: 05/19/2025  -     Comprehensive Metabolic Panel; Future; Expected date: 05/19/2025    Tobacco abuse  -     CT Chest Lung Screening Low Dose; Future; Expected date: 11/20/2024    Personal history of nicotine dependence  -     CT Chest Lung Screening Low Dose; Future; Expected date: 11/20/2024    Preventative health care  -     influenza (adjuvanted) (Fluad) 45 mcg/0.5 mL IM vaccine (> or = 66 yo) 0.5 mL          Iron deficiency improved  Continue iron daily to improve iron stores until current supply is gone  doing well  Counseled on regular exercise, maintenance of a healthy weight, balanced diet rich in fruits/vegetables and lean protein, and avoidance of unhealthy habits like smoking and excessive alcohol intake.  Xanax 1mg  prn anxiety  Effexor 75mg daily  continue Nexium  Continue other present meds and annual cardiology follow-up    F/u 6 months with labs for physical      Visit today included increased complexity associated with the care of the episodic problems listed above addressed and managing the longitudinal care of the patient due to the serious and/or complex managed problem(s) listed above.

## 2024-11-27 ENCOUNTER — HOSPITAL ENCOUNTER (OUTPATIENT)
Dept: RADIOLOGY | Facility: HOSPITAL | Age: 69
Discharge: HOME | End: 2024-11-27
Attending: FAMILY MEDICINE
Payer: MEDICARE

## 2024-11-27 DIAGNOSIS — Z87.891 PERSONAL HISTORY OF NICOTINE DEPENDENCE: ICD-10-CM

## 2024-11-27 DIAGNOSIS — Z72.0 TOBACCO ABUSE: ICD-10-CM

## 2024-11-27 PROCEDURE — 71271 CT THORAX LUNG CANCER SCR C-: CPT | Mod: TC,PO

## 2025-01-10 RX ORDER — ATORVASTATIN CALCIUM 80 MG/1
80 TABLET, FILM COATED ORAL NIGHTLY
Qty: 90 TABLET | Refills: 1 | Status: SHIPPED | OUTPATIENT
Start: 2025-01-10

## 2025-01-10 NOTE — TELEPHONE ENCOUNTER
Refill Decision Note   Jose Blanco  is requesting a refill authorization.  Brief Assessment and Rationale for Refill:  Approve     Medication Therapy Plan:         Comments:     Note composed:3:43 AM 01/10/2025

## 2025-01-10 NOTE — TELEPHONE ENCOUNTER
No care due was identified.  [facility] Embedded Care Due Messages. Reference number: [id number].   1/10/2025 12:22:27 AM CST

## 2025-02-03 DIAGNOSIS — F41.1 GAD (GENERALIZED ANXIETY DISORDER): ICD-10-CM

## 2025-02-04 RX ORDER — VENLAFAXINE HYDROCHLORIDE 75 MG/1
75 CAPSULE, EXTENDED RELEASE ORAL
Qty: 90 CAPSULE | Refills: 3 | Status: SHIPPED | OUTPATIENT
Start: 2025-02-04

## 2025-02-04 NOTE — TELEPHONE ENCOUNTER
No care due was identified.  [facility] Embedded Care Due Messages. Reference number: [id number].   2/03/2025 9:33:57 PM CST

## 2025-02-04 NOTE — TELEPHONE ENCOUNTER
Refill Routing Note   Medication(s) are not appropriate for processing by [facility] for the following reason(s):        Drug-disease interaction  Drug-Disease: venlafaxine and Hepatic cyst; Hepatic lesion    [facility] action(s):  Defer               Appointments  past 12m or future 3m with PCP    Date Provider   Last Visit   11/20/2024 eRd Camejo MD   Next Visit   5/20/2025 Red Camejo MD   ED visits in past 90 days: 0        Note composed:3:24 AM 02/04/2025

## 2025-02-20 DIAGNOSIS — K22.70 BARRETT'S ESOPHAGUS WITHOUT DYSPLASIA: ICD-10-CM

## 2025-02-20 RX ORDER — ESOMEPRAZOLE MAGNESIUM 40 MG/1
40 CAPSULE, DELAYED RELEASE ORAL
Qty: 90 CAPSULE | Refills: 2 | Status: SHIPPED | OUTPATIENT
Start: 2025-02-20

## 2025-02-20 NOTE — TELEPHONE ENCOUNTER
Care Due:                  Date            Visit Type   Department     Provider  --------------------------------------------------------------------------------                                EP -                              [facility]  Last Visit: 11-      CARE ([facility])   MEDICINE       BRANDON HORNE                              EP -                              PRIMARY      [facility]  Next Visit: 05-      CARE ([facility])   SARWAT HORNE                                                            Last  Test          Frequency    Reason                     Performed    Due Date  --------------------------------------------------------------------------------    Lipid Panel.  12 months..  atorvastatin.............  05-   05-    [facility] Embedded Care Due Messages. Reference number: [id number].   2/20/2025 12:17:01 PM CST

## 2025-02-20 NOTE — TELEPHONE ENCOUNTER
Refill Decision Note   Jose Bella  is requesting a refill authorization.  Brief Assessment and Rationale for Refill:  Approve     Medication Therapy Plan:         Pharmacist review requested: Yes   Comments:     Note composed:2:32 PM 02/20/2025

## 2025-02-20 NOTE — TELEPHONE ENCOUNTER
Refill Routing Note   Medication(s) are not appropriate for processing by [facility] for the following reason(s):        Drug-disease interaction    [facility] action(s):  Defer        Medication Therapy Plan: FLOS; Drug-Disease: esomeprazole and Hepatic cyst; Hepatic lesion      Appointments  past 12m or future 3m with PCP    Date Provider   Last Visit   11/20/2024 Red Camejo MD   Next Visit   5/20/2025 Rde Camejo MD   ED visits in past 90 days: 0        Note composed:2:31 PM 02/20/2025

## 2025-02-25 RX ORDER — METOPROLOL SUCCINATE 25 MG/1
25 TABLET, EXTENDED RELEASE ORAL
Qty: 90 TABLET | Refills: 2 | Status: SHIPPED | OUTPATIENT
Start: 2025-02-25

## 2025-02-25 NOTE — TELEPHONE ENCOUNTER
No care due was identified.  [facility] Embedded Care Due Messages. Reference number: [id number].   2/25/2025 12:13:42 AM CST

## 2025-02-25 NOTE — TELEPHONE ENCOUNTER
Refill Decision Note   Jose Blanco  is requesting a refill authorization.  Brief Assessment and Rationale for Refill:  Approve     Medication Therapy Plan:         Comments:     Note composed:2:15 AM 02/25/2025

## 2025-03-27 DIAGNOSIS — F41.1 GAD (GENERALIZED ANXIETY DISORDER): ICD-10-CM

## 2025-03-27 RX ORDER — ALPRAZOLAM 1 MG/1
1 TABLET ORAL 2 TIMES DAILY
Qty: 60 TABLET | Refills: 5 | Status: SHIPPED | OUTPATIENT
Start: 2025-03-27

## 2025-03-27 NOTE — TELEPHONE ENCOUNTER
No care due was identified.  [facility] Embedded Care Due Messages. Reference number: [id number].   3/27/2025 1:42:04 PM CDT

## 2025-05-06 DIAGNOSIS — N52.8 OTHER MALE ERECTILE DYSFUNCTION: ICD-10-CM

## 2025-05-06 RX ORDER — ATORVASTATIN CALCIUM 80 MG/1
80 TABLET, FILM COATED ORAL NIGHTLY
Qty: 90 TABLET | Refills: 0 | Status: SHIPPED | OUTPATIENT
Start: 2025-05-06

## 2025-05-06 RX ORDER — SILDENAFIL CITRATE 20 MG/1
20 TABLET ORAL 3 TIMES DAILY
Qty: 30 TABLET | Refills: 5 | Status: SHIPPED | OUTPATIENT
Start: 2025-05-06 | End: 2025-05-07

## 2025-05-06 NOTE — TELEPHONE ENCOUNTER
Care Due:                  Date            Visit Type   Department     Provider  --------------------------------------------------------------------------------                                EP -                              PRIMARY      [facility] FAMILY  Last Visit: 11-      CARE ([facility])   MEDICINE       Red Camejo  Next Visit: None Scheduled  None         None Found                                                            Last  Test          Frequency    Reason                     Performed    Due Date  --------------------------------------------------------------------------------    Lipid Panel.  12 months..  atorvastatin.............  05-   05-    [facility] Embedded Care Due Messages. Reference number: [id number].   5/06/2025 2:39:15 PM CDT

## 2025-05-07 DIAGNOSIS — N52.8 OTHER MALE ERECTILE DYSFUNCTION: ICD-10-CM

## 2025-05-07 RX ORDER — SILDENAFIL CITRATE 20 MG/1
20 TABLET ORAL 3 TIMES DAILY
Qty: 30 TABLET | Refills: 5 | Status: SHIPPED | OUTPATIENT
Start: 2025-05-07

## 2025-05-07 NOTE — TELEPHONE ENCOUNTER
Refill Routing Note   Medication(s) are not appropriate for processing by [facility] for the following reason(s):        Other    [facility] action(s):  Defer      Medication Therapy Plan: Pharmacy comment: Script Clarification:IS THE SIG CORRECT TID FOR ED?      Appointments  past 12m or future 3m with PCP    Date Provider   Last Visit   11/20/2024 Red Camejo MD   Next Visit   Visit date not found Red Camejo MD   ED visits in past 90 days: 0        Note composed:10:39 AM 05/07/2025

## 2025-05-07 NOTE — TELEPHONE ENCOUNTER
Provider Staff:  Action required for this patient    Requires labs      Please see care gap opportunities below in Care Due Message.    Thanks!  [facility]     Appointments      Date Provider   Last Visit   11/20/2024 Red Camejo MD   Next Visit   Visit date not found Red Camejo MD     Refill Decision Note   Jose Blanco  is requesting a refill authorization.  Brief Assessment and Rationale for Refill:  Approve     Medication Therapy Plan:        Comments:     Note composed:11:10 PM 05/06/2025

## 2025-05-07 NOTE — TELEPHONE ENCOUNTER
No care due was identified.  [facility] Embedded Care Due Messages. Reference number: [id number].   5/07/2025 10:25:41 AM CDT   Okay to refill per Dr. Rojas. Prescription sent to preferred pharmacy.

## 2025-05-14 ENCOUNTER — LAB VISIT (OUTPATIENT)
Dept: LAB | Facility: HOSPITAL | Age: 70
End: 2025-05-14
Attending: FAMILY MEDICINE
Payer: MEDICARE

## 2025-05-14 DIAGNOSIS — I10 HYPERTENSION, UNSPECIFIED TYPE: ICD-10-CM

## 2025-05-14 DIAGNOSIS — Z12.5 PROSTATE CANCER SCREENING: ICD-10-CM

## 2025-05-14 LAB
ABSOLUTE EOSINOPHIL (OHS): 0.48 K/UL
ABSOLUTE MONOCYTE (OHS): 0.86 K/UL (ref 0.3–1)
ABSOLUTE NEUTROPHIL COUNT (OHS): 4.61 K/UL (ref 1.8–7.7)
ALBUMIN SERPL BCP-MCNC: 3.6 G/DL (ref 3.5–5.2)
ALP SERPL-CCNC: 111 UNIT/L (ref 40–150)
ALT SERPL W/O P-5'-P-CCNC: 21 UNIT/L (ref 10–44)
ANION GAP (OHS): 6 MMOL/L (ref 8–16)
AST SERPL-CCNC: 23 UNIT/L (ref 11–45)
BASOPHILS # BLD AUTO: 0.06 K/UL
BASOPHILS NFR BLD AUTO: 0.6 %
BILIRUB SERPL-MCNC: 1 MG/DL (ref 0.1–1)
BUN SERPL-MCNC: 13 MG/DL (ref 8–23)
CALCIUM SERPL-MCNC: 9 MG/DL (ref 8.7–10.5)
CHLORIDE SERPL-SCNC: 104 MMOL/L (ref 95–110)
CHOLEST SERPL-MCNC: 132 MG/DL (ref 120–199)
CHOLEST/HDLC SERPL: 3.8 {RATIO} (ref 2–5)
CO2 SERPL-SCNC: 28 MMOL/L (ref 23–29)
CREAT SERPL-MCNC: 1 MG/DL (ref 0.5–1.4)
ERYTHROCYTE [DISTWIDTH] IN BLOOD BY AUTOMATED COUNT: 14.1 % (ref 11.5–14.5)
GFR SERPLBLD CREATININE-BSD FMLA CKD-EPI: >60 ML/MIN/1.73/M2
GLUCOSE SERPL-MCNC: 109 MG/DL (ref 70–110)
HCT VFR BLD AUTO: 49.6 % (ref 40–54)
HDLC SERPL-MCNC: 35 MG/DL (ref 40–75)
HDLC SERPL: 26.5 % (ref 20–50)
HGB BLD-MCNC: 16.2 GM/DL (ref 14–18)
IMM GRANULOCYTES # BLD AUTO: 0.03 K/UL (ref 0–0.04)
IMM GRANULOCYTES NFR BLD AUTO: 0.3 % (ref 0–0.5)
LDLC SERPL CALC-MCNC: 82.4 MG/DL (ref 63–159)
LYMPHOCYTES # BLD AUTO: 3.55 K/UL (ref 1–4.8)
MCH RBC QN AUTO: 30.8 PG (ref 27–31)
MCHC RBC AUTO-ENTMCNC: 32.7 G/DL (ref 32–36)
MCV RBC AUTO: 94 FL (ref 82–98)
NONHDLC SERPL-MCNC: 97 MG/DL
NUCLEATED RBC (/100WBC) (OHS): 0 /100 WBC
PLATELET # BLD AUTO: 267 K/UL (ref 150–450)
PMV BLD AUTO: 11 FL (ref 9.2–12.9)
POTASSIUM SERPL-SCNC: 4.7 MMOL/L (ref 3.5–5.1)
PROT SERPL-MCNC: 7.1 GM/DL (ref 6–8.4)
PSA SERPL-MCNC: 5.54 NG/ML
RBC # BLD AUTO: 5.26 M/UL (ref 4.6–6.2)
RELATIVE EOSINOPHIL (OHS): 5 %
RELATIVE LYMPHOCYTE (OHS): 37 % (ref 18–48)
RELATIVE MONOCYTE (OHS): 9 % (ref 4–15)
RELATIVE NEUTROPHIL (OHS): 48.1 % (ref 38–73)
SODIUM SERPL-SCNC: 138 MMOL/L (ref 136–145)
TRIGL SERPL-MCNC: 73 MG/DL (ref 30–150)
TSH SERPL-ACNC: 2.51 UIU/ML (ref 0.4–4)
WBC # BLD AUTO: 9.59 K/UL (ref 3.9–12.7)

## 2025-05-14 PROCEDURE — 36415 COLL VENOUS BLD VENIPUNCTURE: CPT | Mod: PO

## 2025-05-14 PROCEDURE — 85025 COMPLETE CBC W/AUTO DIFF WBC: CPT

## 2025-05-14 PROCEDURE — 80053 COMPREHEN METABOLIC PANEL: CPT

## 2025-05-14 PROCEDURE — 80061 LIPID PANEL: CPT

## 2025-05-14 PROCEDURE — 84443 ASSAY THYROID STIM HORMONE: CPT

## 2025-05-14 PROCEDURE — 84153 ASSAY OF PSA TOTAL: CPT

## 2025-05-15 ENCOUNTER — TELEPHONE (OUTPATIENT)
Dept: FAMILY MEDICINE | Facility: CLINIC | Age: 70
End: 2025-05-15
Payer: MEDICARE

## 2025-05-15 NOTE — TELEPHONE ENCOUNTER
----- Message from Lucrecia sent at 5/15/2025  2:23 PM CDT -----  Type:  Sooner Appointment RequestCaller is requesting a sooner appointment.  Caller declined first available appointment listed below.  Caller will not accept being placed on the waitlist and is requesting a message be sent to doctor.Name of Caller:  pt When is the first available appointment?  June 26Symptoms:  f/u Would the patient rather a call back or a response via [facility]? Call Best Call Back Number:  [phone] (home) Additional Information:  please advise

## 2025-05-19 ENCOUNTER — OFFICE VISIT (OUTPATIENT)
Dept: FAMILY MEDICINE | Facility: CLINIC | Age: 70
End: 2025-05-19
Payer: MEDICARE

## 2025-05-19 VITALS
DIASTOLIC BLOOD PRESSURE: 72 MMHG | HEIGHT: 67 IN | SYSTOLIC BLOOD PRESSURE: 118 MMHG | OXYGEN SATURATION: 96 % | BODY MASS INDEX: 29.58 KG/M2 | WEIGHT: 188.5 LBS | HEART RATE: 84 BPM

## 2025-05-19 DIAGNOSIS — F41.9 ANXIETY: ICD-10-CM

## 2025-05-19 DIAGNOSIS — F41.1 GAD (GENERALIZED ANXIETY DISORDER): ICD-10-CM

## 2025-05-19 DIAGNOSIS — I25.110 CORONARY ARTERY DISEASE INVOLVING NATIVE CORONARY ARTERY OF NATIVE HEART WITH UNSTABLE ANGINA PECTORIS: ICD-10-CM

## 2025-05-19 DIAGNOSIS — I10 HYPERTENSION, UNSPECIFIED TYPE: Primary | ICD-10-CM

## 2025-05-19 PROCEDURE — 1160F RVW MEDS BY RX/DR IN RCRD: CPT | Mod: CPTII,S$GLB,, | Performed by: FAMILY MEDICINE

## 2025-05-19 PROCEDURE — 3008F BODY MASS INDEX DOCD: CPT | Mod: CPTII,S$GLB,, | Performed by: FAMILY MEDICINE

## 2025-05-19 PROCEDURE — 1101F PT FALLS ASSESS-DOCD LE1/YR: CPT | Mod: CPTII,S$GLB,, | Performed by: FAMILY MEDICINE

## 2025-05-19 PROCEDURE — G2211 COMPLEX E/M VISIT ADD ON: HCPCS | Mod: S$GLB,,, | Performed by: FAMILY MEDICINE

## 2025-05-19 PROCEDURE — 3288F FALL RISK ASSESSMENT DOCD: CPT | Mod: CPTII,S$GLB,, | Performed by: FAMILY MEDICINE

## 2025-05-19 PROCEDURE — 1126F AMNT PAIN NOTED NONE PRSNT: CPT | Mod: CPTII,S$GLB,, | Performed by: FAMILY MEDICINE

## 2025-05-19 PROCEDURE — 3074F SYST BP LT 130 MM HG: CPT | Mod: CPTII,S$GLB,, | Performed by: FAMILY MEDICINE

## 2025-05-19 PROCEDURE — 99214 OFFICE O/P EST MOD 30 MIN: CPT | Mod: S$GLB,,, | Performed by: FAMILY MEDICINE

## 2025-05-19 PROCEDURE — 3078F DIAST BP <80 MM HG: CPT | Mod: CPTII,S$GLB,, | Performed by: FAMILY MEDICINE

## 2025-05-19 PROCEDURE — 99999 PR PBB SHADOW E&M-EST. PATIENT-LVL III: CPT | Mod: PBBFAC,,, | Performed by: FAMILY MEDICINE

## 2025-05-19 PROCEDURE — 1159F MED LIST DOCD IN RCRD: CPT | Mod: CPTII,S$GLB,, | Performed by: FAMILY MEDICINE

## 2025-05-19 PROCEDURE — 4010F ACE/ARB THERAPY RXD/TAKEN: CPT | Mod: CPTII,S$GLB,, | Performed by: FAMILY MEDICINE

## 2025-05-19 NOTE — PROGRESS NOTES
Chief Complaint   Patient presents with    Anemia     6 month follow up with labs       This is a 70-year-old white male who presents for annual exam and 6 month f/u    Overall doing well    Iron deficiency - completed course of oral iron  CAD s/p stents, previous MI - tolerating Lipitor 80mg, toprol, lisinopril 2.5mg, ASA 81mg; following with cardiology, Dr. Olson  Anxiety -  Tolerating Effexor XR 75mg daily.  Mood is controlled. He is taking Xanax 1mg 1.5 - 2 tabs daily.   Barretts - doing well on the Nexium 40 mg daily  Hearing loss - Wearing hearing aids  BPH - getting up once nightly; no new urinary symptoms      Past Medical History:   Diagnosis Date    Anxiety     Diaz esophagus     BPH (benign prostatic hyperplasia)     CAD (coronary artery disease)     Essential tremor     HEARING LOSS     hearing aids    Hepatic cyst     History of MI (myocardial infarction)     HLD (hyperlipidemia)     Shingles     right upper back     Past Surgical History:   Procedure Laterality Date    CORONARY STENT PLACEMENT      RCA, LAD    EXAMINATION UNDER ANESTHESIA N/A 1/27/2024    Procedure: Exam under anesthesia- rectum;  Surgeon: Moshe Rogers MD;  Location: [facility] OR;  Service: General;  Laterality: N/A;  rectal bleeding s/p hemorrhoidectomy    EXCISIONAL HEMORRHOIDECTOMY N/A 1/17/2024    Procedure: HEMORRHOIDECTOMY;  Surgeon: Kwame Zee MD;  Location: [facility] OR;  Service: General;  Laterality: N/A;  Internal/ external    LEFT HEART CATHETERIZATION Left 06/15/2018    Procedure: Left heart cath;  Surgeon: Gino Olson MD;  Location: [facility] CATH;  Service: Cardiology;  Laterality: Left;    NASAL SEPTUM SURGERY      PARTIAL KNEE ARTHROPLASTY Left     RETURN TO OPERATING ROOM, FOR MINOR POSTOPERATIVE HEMORRHAGE CONTROL  1/27/2024    Procedure: Oversew/control of hemorrhage;  Surgeon: Moshe Rogers MD;  Location: [facility] OR;  Service: General;;    TONSILLECTOMY, ADENOIDECTOMY, BILATERAL MYRINGOTOMY AND TUBES        Current Outpatient Medications on File Prior to Visit   Medication Sig Dispense Refill    ALPRAZolam (XANAX) 1 MG tablet TAKE 1 TABLET BY MOUTH TWICE A DAY 60 tablet 5    aspirin (ECOTRIN) 81 MG EC tablet Take 1 tablet (81 mg total) by mouth once daily.  0    atorvastatin (LIPITOR) 80 MG tablet TAKE 1 TABLET BY MOUTH EVERY EVENING 90 tablet 0    esomeprazole (NEXIUM) 40 MG capsule TAKE 1 CAPSULE BY MOUTH EVERY DAY 90 capsule 2    lisinopriL (PRINIVIL,ZESTRIL) 2.5 MG tablet Take 1 tablet (2.5 mg total) by mouth once daily. 90 tablet 3    metoprolol succinate (TOPROL-XL) 25 MG 24 hr tablet TAKE 1 TABLET BY MOUTH EVERY DAY 90 tablet 2    sildenafil (REVATIO) 20 mg Tab TAKE 1 TABLET BY MOUTH THREE TIMES A DAY 30 tablet 5    venlafaxine (EFFEXOR-XR) 75 MG 24 hr capsule TAKE 1 CAPSULE BY MOUTH EVERY DAY 90 capsule 3    [DISCONTINUED] ferrous sulfate (FEOSOL) 325 mg (65 mg iron) Tab tablet Take 1 tablet (325 mg total) by mouth once daily. (Patient not taking: Reported on 5/19/2025) 90 tablet 0     Current Facility-Administered Medications on File Prior to Visit   Medication Dose Route Frequency Provider Last Rate Last Admin    lactated ringers infusion   Intravenous Continuous Margaret Abdul MD 10 mL/hr at 01/17/24 1022 New Bag at 01/27/24 0230    LIDOcaine (PF) 10 mg/ml (1%) injection 10 mg  1 mL Intradermal Once Margaret Abdul MD             FAMILY HISTORY:  Father: CABG at 67, prostate cancer  Mother:alzheimer's  3 sisters  1 brother    SOCIAL HISTORY:  Tobacco use: variable (1/2 PPD)  Alcohol use:weekly wine  Ilicit drug use:denies  Occupation:residential development,   Marital status:  Children:5    REVIEW OF SYSTEMS:  GENERAL: No fever, chills, fatigability or weight changes.  SKIN/BREASTS: No rashes, sores, itching or changes in color or texture of skin.  HEAD: No headaches or recent head trauma.   EYES: Visual acuity fine. Denies blurriness, tearing, itching, photophobia, diplopia,  "or visual changes.   EARS: Denies ear pain, discharge, tinnitus or vertigo.  NOSE: No loss of smell, epistaxis, postnasal drip, discharge, obstruction, or sneezing.  MOUTH & THROAT: No hoarseness, change in voice, swallowing difficulty. No excessive gum bleeding.   HEMATOLOGICAL/NODES: Denies swollen glands. No bleeding or bruising.  CHEST: No KOVACS, cyanosis, wheezing, cough or sputum production.  CARDIOVASCULAR: Denies dyspnea, orthopnea, or palpitations.  GI/ABDOMEN: Appetite fine. No weight loss. Denies nausea, vomiting, diarrhea, constipation, abdominal pain, hematemesis or blood in stool.  URINARY: No dysuria,hematuria, nocturia, incontinence, flank pain, urgency, or urinary difficulty.  PERIPHERAL VASCULAR: No claudication, cold intolerance or cyanosis.  MUSCULOSKELETAL: No joint stiffness, pain, swelling, or loss of strength. Denies back pain.  NEUROLOGIC: No history of seizures, paralysis, alteration of gait or coordination.      PHYSICAL EXAM:  /72   Pulse 84   Ht 5' 7" (1.702 m)   Wt 85.5 kg (188 lb 7.9 oz)   SpO2 96%   BMI 29.52 kg/m²     APPEARANCE: Well nourished, well developed, neatly groomed, in no acute distress.   SKIN: Warm, moist and dry. No lesions or rashes.  NECK: Supple with full range of motion. No masses. No thyromegaly. No JVD or bruits.  CHEST: CTA bilaterally  CV: S1S2 rrr no m/r/g  EXTREMITIES: No edema or cyanosis. Pedal pulses 2+ bilaterally. No varicosities   Gait & Posture: Normal gait and fine motion.  PSYCH: Awake, alert, oriented to person, place, time, and situation. Pleasant and cooperative mood with intact judgment.    Results for orders placed or performed in visit on 05/14/25   PSA, Screening    Collection Time: 05/14/25  9:25 AM   Result Value Ref Range    Prostate Specific Antigen 5.54 (H) <=4.00 ng/mL   TSH    Collection Time: 05/14/25  9:25 AM   Result Value Ref Range    TSH 2.506 0.400 - 4.000 uIU/mL   Lipid Panel    Collection Time: 05/14/25  9:25 AM   Result " Value Ref Range    Cholesterol Total 132 120 - 199 mg/dL    Triglyceride 73 30 - 150 mg/dL    HDL Cholesterol 35 (L) 40 - 75 mg/dL    LDL Cholesterol 82.4 63.0 - 159.0 mg/dL    HDL/Cholesterol Ratio 26.5 20.0 - 50.0 %    Cholesterol/HDL Ratio 3.8 2.0 - 5.0    Non HDL Cholesterol 97 mg/dL   Comprehensive Metabolic Panel    Collection Time: 05/14/25  9:25 AM   Result Value Ref Range    Sodium 138 136 - 145 mmol/L    Potassium 4.7 3.5 - 5.1 mmol/L    Chloride 104 95 - 110 mmol/L    CO2 28 23 - 29 mmol/L    Glucose 109 70 - 110 mg/dL    BUN 13 8 - 23 mg/dL    Creatinine 1.0 0.5 - 1.4 mg/dL    Calcium 9.0 8.7 - 10.5 mg/dL    Protein Total 7.1 6.0 - 8.4 gm/dL    Albumin 3.6 3.5 - 5.2 g/dL    Bilirubin Total 1.0 0.1 - 1.0 mg/dL     40 - 150 unit/L    AST 23 11 - 45 unit/L    ALT 21 10 - 44 unit/L    Anion Gap 6 (L) 8 - 16 mmol/L    eGFR >60 >60 mL/min/1.73/m2   CBC with Differential    Collection Time: 05/14/25  9:25 AM   Result Value Ref Range    WBC 9.59 3.90 - 12.70 K/uL    RBC 5.26 4.60 - 6.20 M/uL    HGB 16.2 14.0 - 18.0 gm/dL    HCT 49.6 40.0 - 54.0 %    MCV 94 82 - 98 fL    MCH 30.8 27.0 - 31.0 pg    MCHC 32.7 32.0 - 36.0 g/dL    RDW 14.1 11.5 - 14.5 %    Platelet Count 267 150 - 450 K/uL    MPV 11.0 9.2 - 12.9 fL    Nucleated RBC 0 <=0 /100 WBC    Neut % 48.1 38 - 73 %    Lymph % 37.0 18 - 48 %    Mono % 9.0 4 - 15 %    Eos % 5.0 <=8 %    Basophil % 0.6 <=1.9 %    Imm Grans % 0.3 0.0 - 0.5 %    Neut # 4.61 1.8 - 7.7 K/uL    Lymph # 3.55 1 - 4.8 K/uL    Mono # 0.86 0.3 - 1 K/uL    Eos # 0.48 <=0.5 K/uL    Baso # 0.06 <=0.2 K/uL    Imm Grans # 0.03 0.00 - 0.04 K/uL      reviewed    ASSESSMENT/PLAN:    Hypertension, unspecified type    SHAYAN (generalized anxiety disorder)    Coronary artery disease involving native coronary artery of native heart with unstable angina pectoris    Anxiety      Shingrix today and repeat   doing well  Counseled on regular exercise, maintenance of a healthy weight, balanced diet  rich in fruits/vegetables and lean protein, and avoidance of unhealthy habits like smoking and excessive alcohol intake.  Xanax 1mg prn anxiety  Effexor 75mg daily  continue Nexium  Continue other present meds and annual cardiology follow-up    F/u 6 months      Visit today included increased complexity associated with the care of the episodic problems listed above addressed and managing the longitudinal care of the patient due to the serious and/or complex managed problem(s) listed above.

## 2025-06-05 ENCOUNTER — TELEPHONE (OUTPATIENT)
Dept: FAMILY MEDICINE | Facility: CLINIC | Age: 70
End: 2025-06-05
Payer: MEDICARE

## 2025-06-17 RX ORDER — LISINOPRIL 2.5 MG/1
2.5 TABLET ORAL DAILY
Qty: 90 TABLET | Refills: 3
Start: 2025-06-17 | End: 2026-06-17

## 2025-06-17 NOTE — TELEPHONE ENCOUNTER
No care due was identified.  [facility] Embedded Care Due Messages. Reference number: [id number].   6/17/2025 1:25:13 PM CDT

## 2025-07-09 ENCOUNTER — TELEPHONE (OUTPATIENT)
Dept: PHARMACY | Facility: CLINIC | Age: 70
End: 2025-07-09
Payer: MEDICARE

## 2025-07-09 NOTE — TELEPHONE ENCOUNTER
[facility]/Population Health Chart Review & Patient Outreach Details For Medication Adherence Project    Reason for Outreach Encounter: 3rd Party payor non-compliance report ([employer], [employer], C, etc)  2.  Patient Outreach Method: Reviewed patient chart   3.   Medication in question:    Hypertension Medications              lisinopriL (PRINIVIL,ZESTRIL) 2.5 MG tablet Take 1 tablet (2.5 mg total) by mouth once daily.    metoprolol succinate (TOPROL-XL) 25 MG 24 hr tablet TAKE 1 TABLET BY MOUTH EVERY DAY                 LF 90 ds 6/19/25    4.  Reviewed and or Updates Made To: Patient Chart  5. Outreach Outcomes and/or actions taken: Patient filled medication and is on track to be adherent  Additional Notes:

## 2025-09-04 RX ORDER — METOPROLOL SUCCINATE 25 MG/1
25 TABLET, EXTENDED RELEASE ORAL
Qty: 90 TABLET | Refills: 2 | Status: SHIPPED | OUTPATIENT
Start: 2025-09-04

## (undated) DEVICE — ELECTRODE REM PLYHSV RETURN 9

## (undated) DEVICE — SHEARS HARMONIC CRVD 9 CM

## (undated) DEVICE — GAUZE SPONGE 4X4 12PLY

## (undated) DEVICE — DRAPE LAP T SHT W/ INSTR PAD

## (undated) DEVICE — SEE L#120831

## (undated) DEVICE — Device

## (undated) DEVICE — JELLY LUBRICANT STERILE 4 OZ

## (undated) DEVICE — TAPE SILK 3IN

## (undated) DEVICE — GLOVE BIOGEL 7.5

## (undated) DEVICE — KIT SAHARA DRAPE DRAW/LIFT

## (undated) DEVICE — TIP YANKAUERS BULB NO VENT

## (undated) DEVICE — PENCIL ROCKER SWITCH 10FT CORD

## (undated) DEVICE — DRAPE HALF SURGICAL 40X58IN

## (undated) DEVICE — TOWEL OR DISP STRL BLUE 4/PK

## (undated) DEVICE — SUT 3-0 VICRYL / SH (J416)

## (undated) DEVICE — STRAP OR TABLE 5IN X 72IN